# Patient Record
Sex: MALE | Race: WHITE | NOT HISPANIC OR LATINO | Employment: OTHER | URBAN - METROPOLITAN AREA
[De-identification: names, ages, dates, MRNs, and addresses within clinical notes are randomized per-mention and may not be internally consistent; named-entity substitution may affect disease eponyms.]

---

## 2022-08-31 ENCOUNTER — APPOINTMENT (EMERGENCY)
Dept: CT IMAGING | Facility: HOSPITAL | Age: 82
DRG: 871 | End: 2022-08-31
Payer: COMMERCIAL

## 2022-08-31 ENCOUNTER — HOSPITAL ENCOUNTER (INPATIENT)
Facility: HOSPITAL | Age: 82
LOS: 2 days | Discharge: HOME | DRG: 871 | End: 2022-09-02
Attending: EMERGENCY MEDICINE | Admitting: HOSPITALIST
Payer: COMMERCIAL

## 2022-08-31 ENCOUNTER — APPOINTMENT (OUTPATIENT)
Dept: RADIOLOGY | Facility: HOSPITAL | Age: 82
DRG: 871 | End: 2022-08-31
Payer: COMMERCIAL

## 2022-08-31 DIAGNOSIS — N17.9 AKI (ACUTE KIDNEY INJURY) (HCC): ICD-10-CM

## 2022-08-31 DIAGNOSIS — A41.9 SEPSIS WITH ACUTE RENAL FAILURE WITHOUT SEPTIC SHOCK, DUE TO UNSPECIFIED ORGANISM, UNSPECIFIED ACUTE RENAL FAILURE TYPE (HCC): Primary | ICD-10-CM

## 2022-08-31 DIAGNOSIS — R65.20 SEPSIS WITH ACUTE RENAL FAILURE WITHOUT SEPTIC SHOCK, DUE TO UNSPECIFIED ORGANISM, UNSPECIFIED ACUTE RENAL FAILURE TYPE (HCC): Primary | ICD-10-CM

## 2022-08-31 DIAGNOSIS — J18.9 LEFT LOWER LOBE PNEUMONIA: ICD-10-CM

## 2022-08-31 DIAGNOSIS — N17.9 SEPSIS WITH ACUTE RENAL FAILURE WITHOUT SEPTIC SHOCK, DUE TO UNSPECIFIED ORGANISM, UNSPECIFIED ACUTE RENAL FAILURE TYPE (HCC): Primary | ICD-10-CM

## 2022-08-31 PROBLEM — J18.1 LOBAR PNEUMONIA (HCC): Status: ACTIVE | Noted: 2022-08-31

## 2022-08-31 PROBLEM — I10 HTN (HYPERTENSION): Status: ACTIVE | Noted: 2022-08-31

## 2022-08-31 PROBLEM — R79.89 ELEVATED SERUM CREATININE: Status: ACTIVE | Noted: 2022-08-31

## 2022-08-31 PROBLEM — G92.8 TOXIC METABOLIC ENCEPHALOPATHY: Status: ACTIVE | Noted: 2022-08-31

## 2022-08-31 LAB
2HR DELTA HS TROPONIN: 1 NG/L
ALBUMIN SERPL BCP-MCNC: 3.3 G/DL (ref 3.5–5)
ALP SERPL-CCNC: 82 U/L (ref 46–116)
ALT SERPL W P-5'-P-CCNC: 58 U/L (ref 12–78)
ANION GAP SERPL CALCULATED.3IONS-SCNC: 6 MMOL/L (ref 4–13)
APTT PPP: 26 SECONDS (ref 23–37)
AST SERPL W P-5'-P-CCNC: 51 U/L (ref 5–45)
BASE EX.OXY STD BLDV CALC-SCNC: 68.7 % (ref 60–80)
BASE EXCESS BLDA CALC-SCNC: 6 MMOL/L (ref -2–3)
BASE EXCESS BLDV CALC-SCNC: 1.1 MMOL/L
BASOPHILS # BLD AUTO: 0.02 THOUSANDS/ΜL (ref 0–0.1)
BASOPHILS NFR BLD AUTO: 0 % (ref 0–1)
BILIRUB SERPL-MCNC: 1 MG/DL (ref 0.2–1)
BILIRUB UR QL STRIP: NEGATIVE
BUN SERPL-MCNC: 33 MG/DL (ref 5–25)
CA-I BLD-SCNC: 1.14 MMOL/L (ref 1.12–1.32)
CALCIUM ALBUM COR SERPL-MCNC: 9.4 MG/DL (ref 8.3–10.1)
CALCIUM SERPL-MCNC: 8.8 MG/DL (ref 8.3–10.1)
CARDIAC TROPONIN I PNL SERPL HS: 4 NG/L
CARDIAC TROPONIN I PNL SERPL HS: 5 NG/L
CHLORIDE SERPL-SCNC: 98 MMOL/L (ref 96–108)
CLARITY UR: CLEAR
CO2 SERPL-SCNC: 32 MMOL/L (ref 21–32)
COLOR UR: YELLOW
CREAT SERPL-MCNC: 1.85 MG/DL (ref 0.6–1.3)
EOSINOPHIL # BLD AUTO: 0.02 THOUSAND/ΜL (ref 0–0.61)
EOSINOPHIL NFR BLD AUTO: 0 % (ref 0–6)
ERYTHROCYTE [DISTWIDTH] IN BLOOD BY AUTOMATED COUNT: 20.1 % (ref 11.6–15.1)
FLUAV RNA RESP QL NAA+PROBE: NEGATIVE
FLUBV RNA RESP QL NAA+PROBE: NEGATIVE
GFR SERPL CREATININE-BSD FRML MDRD: 33 ML/MIN/1.73SQ M
GLUCOSE SERPL-MCNC: 129 MG/DL (ref 65–140)
GLUCOSE SERPL-MCNC: 157 MG/DL (ref 65–140)
GLUCOSE UR STRIP-MCNC: NEGATIVE MG/DL
HCO3 BLDA-SCNC: 32.2 MMOL/L (ref 24–30)
HCO3 BLDV-SCNC: 26.4 MMOL/L (ref 24–30)
HCT VFR BLD AUTO: 31.5 % (ref 36.5–49.3)
HCT VFR BLD CALC: 31 % (ref 36.5–49.3)
HGB BLD-MCNC: 10.1 G/DL (ref 12–17)
HGB BLDA-MCNC: 10.5 G/DL (ref 12–17)
HGB UR QL STRIP.AUTO: NEGATIVE
IMM GRANULOCYTES # BLD AUTO: 0.19 THOUSAND/UL (ref 0–0.2)
IMM GRANULOCYTES NFR BLD AUTO: 1 % (ref 0–2)
INR PPP: 1.02 (ref 0.84–1.19)
KETONES UR STRIP-MCNC: NEGATIVE MG/DL
LACTATE SERPL-SCNC: 1.3 MMOL/L (ref 0.5–2)
LACTATE SERPL-SCNC: 2.2 MMOL/L (ref 0.5–2)
LEUKOCYTE ESTERASE UR QL STRIP: NEGATIVE
LYMPHOCYTES # BLD AUTO: 0.56 THOUSANDS/ΜL (ref 0.6–4.47)
LYMPHOCYTES NFR BLD AUTO: 3 % (ref 14–44)
MCH RBC QN AUTO: 33.3 PG (ref 26.8–34.3)
MCHC RBC AUTO-ENTMCNC: 32.1 G/DL (ref 31.4–37.4)
MCV RBC AUTO: 104 FL (ref 82–98)
MONOCYTES # BLD AUTO: 1.03 THOUSAND/ΜL (ref 0.17–1.22)
MONOCYTES NFR BLD AUTO: 6 % (ref 4–12)
NEUTROPHILS # BLD AUTO: 14.52 THOUSANDS/ΜL (ref 1.85–7.62)
NEUTS SEG NFR BLD AUTO: 90 % (ref 43–75)
NITRITE UR QL STRIP: NEGATIVE
NRBC BLD AUTO-RTO: 0 /100 WBCS
O2 CT BLDV-SCNC: 10.2 ML/DL
PCO2 BLD: 34 MMOL/L (ref 21–32)
PCO2 BLD: 54.4 MM HG (ref 42–50)
PCO2 BLDV: 45.2 MM HG (ref 42–50)
PH BLD: 7.38 [PH] (ref 7.3–7.4)
PH BLDV: 7.38 [PH] (ref 7.3–7.4)
PH UR STRIP.AUTO: 6 [PH] (ref 4.5–8)
PLATELET # BLD AUTO: 196 THOUSANDS/UL (ref 149–390)
PMV BLD AUTO: 12 FL (ref 8.9–12.7)
PO2 BLD: 22 MM HG (ref 35–45)
PO2 BLDV: 39.8 MM HG (ref 35–45)
POTASSIUM BLD-SCNC: 4.6 MMOL/L (ref 3.5–5.3)
POTASSIUM SERPL-SCNC: 4.5 MMOL/L (ref 3.5–5.3)
PROCALCITONIN SERPL-MCNC: 2.54 NG/ML
PROT SERPL-MCNC: 7.1 G/DL (ref 6.4–8.4)
PROT UR STRIP-MCNC: NEGATIVE MG/DL
PROTHROMBIN TIME: 14.2 SECONDS (ref 11.6–14.5)
RBC # BLD AUTO: 3.03 MILLION/UL (ref 3.88–5.62)
RSV RNA RESP QL NAA+PROBE: NEGATIVE
SAO2 % BLD FROM PO2: 35 % (ref 60–85)
SARS-COV-2 RNA RESP QL NAA+PROBE: NEGATIVE
SODIUM BLD-SCNC: 136 MMOL/L (ref 136–145)
SODIUM SERPL-SCNC: 136 MMOL/L (ref 135–147)
SP GR UR STRIP.AUTO: 1.01 (ref 1–1.03)
SPECIMEN SOURCE: ABNORMAL
UROBILINOGEN UR QL STRIP.AUTO: 0.2 E.U./DL
WBC # BLD AUTO: 16.34 THOUSAND/UL (ref 4.31–10.16)

## 2022-08-31 PROCEDURE — 0241U HB NFCT DS VIR RESP RNA 4 TRGT: CPT | Performed by: PHYSICIAN ASSISTANT

## 2022-08-31 PROCEDURE — 84295 ASSAY OF SERUM SODIUM: CPT

## 2022-08-31 PROCEDURE — 96374 THER/PROPH/DIAG INJ IV PUSH: CPT

## 2022-08-31 PROCEDURE — 71045 X-RAY EXAM CHEST 1 VIEW: CPT

## 2022-08-31 PROCEDURE — 81003 URINALYSIS AUTO W/O SCOPE: CPT

## 2022-08-31 PROCEDURE — 36415 COLL VENOUS BLD VENIPUNCTURE: CPT | Performed by: PHYSICIAN ASSISTANT

## 2022-08-31 PROCEDURE — 93005 ELECTROCARDIOGRAM TRACING: CPT

## 2022-08-31 PROCEDURE — 85014 HEMATOCRIT: CPT

## 2022-08-31 PROCEDURE — 84484 ASSAY OF TROPONIN QUANT: CPT | Performed by: PHYSICIAN ASSISTANT

## 2022-08-31 PROCEDURE — 99285 EMERGENCY DEPT VISIT HI MDM: CPT

## 2022-08-31 PROCEDURE — 85730 THROMBOPLASTIN TIME PARTIAL: CPT | Performed by: PHYSICIAN ASSISTANT

## 2022-08-31 PROCEDURE — 70450 CT HEAD/BRAIN W/O DYE: CPT

## 2022-08-31 PROCEDURE — 84132 ASSAY OF SERUM POTASSIUM: CPT

## 2022-08-31 PROCEDURE — 82330 ASSAY OF CALCIUM: CPT

## 2022-08-31 PROCEDURE — 85610 PROTHROMBIN TIME: CPT | Performed by: PHYSICIAN ASSISTANT

## 2022-08-31 PROCEDURE — 84484 ASSAY OF TROPONIN QUANT: CPT | Performed by: HOSPITALIST

## 2022-08-31 PROCEDURE — 85025 COMPLETE CBC W/AUTO DIFF WBC: CPT | Performed by: PHYSICIAN ASSISTANT

## 2022-08-31 PROCEDURE — 84145 PROCALCITONIN (PCT): CPT | Performed by: PHYSICIAN ASSISTANT

## 2022-08-31 PROCEDURE — 82803 BLOOD GASES ANY COMBINATION: CPT

## 2022-08-31 PROCEDURE — 87040 BLOOD CULTURE FOR BACTERIA: CPT | Performed by: PHYSICIAN ASSISTANT

## 2022-08-31 PROCEDURE — 99223 1ST HOSP IP/OBS HIGH 75: CPT | Performed by: HOSPITALIST

## 2022-08-31 PROCEDURE — 80053 COMPREHEN METABOLIC PANEL: CPT | Performed by: PHYSICIAN ASSISTANT

## 2022-08-31 PROCEDURE — 82805 BLOOD GASES W/O2 SATURATION: CPT | Performed by: PHYSICIAN ASSISTANT

## 2022-08-31 PROCEDURE — 83605 ASSAY OF LACTIC ACID: CPT | Performed by: HOSPITALIST

## 2022-08-31 PROCEDURE — 99285 EMERGENCY DEPT VISIT HI MDM: CPT | Performed by: PHYSICIAN ASSISTANT

## 2022-08-31 PROCEDURE — 82947 ASSAY GLUCOSE BLOOD QUANT: CPT

## 2022-08-31 PROCEDURE — 83605 ASSAY OF LACTIC ACID: CPT | Performed by: PHYSICIAN ASSISTANT

## 2022-08-31 RX ORDER — POLYETHYLENE GLYCOL 3350 17 G/17G
17 POWDER, FOR SOLUTION ORAL DAILY
Status: DISCONTINUED | OUTPATIENT
Start: 2022-08-31 | End: 2022-09-02 | Stop reason: HOSPADM

## 2022-08-31 RX ORDER — BISACODYL 10 MG
10 SUPPOSITORY, RECTAL RECTAL DAILY PRN
Status: DISCONTINUED | OUTPATIENT
Start: 2022-08-31 | End: 2022-09-02 | Stop reason: HOSPADM

## 2022-08-31 RX ORDER — CEFTRIAXONE 1 G/50ML
1000 INJECTION, SOLUTION INTRAVENOUS ONCE
Status: COMPLETED | OUTPATIENT
Start: 2022-08-31 | End: 2022-08-31

## 2022-08-31 RX ORDER — ACETAMINOPHEN 325 MG/1
650 TABLET ORAL EVERY 6 HOURS PRN
Status: DISCONTINUED | OUTPATIENT
Start: 2022-08-31 | End: 2022-09-02 | Stop reason: HOSPADM

## 2022-08-31 RX ORDER — HEPARIN SODIUM 5000 [USP'U]/ML
5000 INJECTION, SOLUTION INTRAVENOUS; SUBCUTANEOUS EVERY 8 HOURS SCHEDULED
Status: DISCONTINUED | OUTPATIENT
Start: 2022-08-31 | End: 2022-09-02 | Stop reason: HOSPADM

## 2022-08-31 RX ORDER — ONDANSETRON 2 MG/ML
4 INJECTION INTRAMUSCULAR; INTRAVENOUS EVERY 6 HOURS PRN
Status: DISCONTINUED | OUTPATIENT
Start: 2022-08-31 | End: 2022-09-02 | Stop reason: HOSPADM

## 2022-08-31 RX ORDER — DOCUSATE SODIUM 100 MG/1
100 CAPSULE, LIQUID FILLED ORAL 2 TIMES DAILY
Status: DISCONTINUED | OUTPATIENT
Start: 2022-08-31 | End: 2022-09-02 | Stop reason: HOSPADM

## 2022-08-31 RX ORDER — SENNOSIDES 8.6 MG
1 TABLET ORAL DAILY
Status: DISCONTINUED | OUTPATIENT
Start: 2022-08-31 | End: 2022-09-02 | Stop reason: HOSPADM

## 2022-08-31 RX ORDER — SODIUM CHLORIDE 9 MG/ML
125 INJECTION, SOLUTION INTRAVENOUS CONTINUOUS
Status: DISPENSED | OUTPATIENT
Start: 2022-08-31 | End: 2022-09-01

## 2022-08-31 RX ADMIN — SENNOSIDES 8.6 MG: 8.6 TABLET, FILM COATED ORAL at 14:06

## 2022-08-31 RX ADMIN — Medication 3.38 G: at 19:46

## 2022-08-31 RX ADMIN — Medication 3.38 G: at 14:05

## 2022-08-31 RX ADMIN — SODIUM CHLORIDE 125 ML/HR: 0.9 INJECTION, SOLUTION INTRAVENOUS at 14:11

## 2022-08-31 RX ADMIN — HEPARIN SODIUM 5000 UNITS: 5000 INJECTION INTRAVENOUS; SUBCUTANEOUS at 14:06

## 2022-08-31 RX ADMIN — SODIUM CHLORIDE 500 ML: 0.9 INJECTION, SOLUTION INTRAVENOUS at 11:24

## 2022-08-31 RX ADMIN — AZITHROMYCIN MONOHYDRATE 500 MG: 500 INJECTION, POWDER, LYOPHILIZED, FOR SOLUTION INTRAVENOUS at 11:54

## 2022-08-31 RX ADMIN — CEFTRIAXONE 1000 MG: 1 INJECTION, SOLUTION INTRAVENOUS at 11:24

## 2022-08-31 RX ADMIN — HEPARIN SODIUM 5000 UNITS: 5000 INJECTION INTRAVENOUS; SUBCUTANEOUS at 22:21

## 2022-08-31 RX ADMIN — DOCUSATE SODIUM 100 MG: 100 CAPSULE, LIQUID FILLED ORAL at 17:44

## 2022-08-31 NOTE — SEPSIS NOTE
Sepsis Note   Amanda Carreno 81 y o  male MRN: [id number]  Unit/Bed#: ED 03 Encounter: [id number]       qSOFA     Row Name 08/31/22 1103 08/31/22 1026             Altered mental status GCS < 15 1 --       Respiratory Rate > / =22 -- 1       Systolic BP < / =353 -- 0       Q Sofa Score 2 1                  Initial Sepsis Screening     Row Name 08/31/22 1112                Is the patient's history suggestive of a new or worsening infection? Yes (Proceed)  -RR        Suspected source of infection pneumonia  -RR        Are two or more of the following signs & symptoms of infection both present and new to the patient? Yes (Proceed)  -RR        Indicate SIRS criteria Tachypnea > 20 resp per min;Leukocytosis (WBC > 01872 IJL)  -RR        If the answer is yes to both questions, suspicion of sepsis is present --        If severe sepsis is present AND tissue hypoperfusion perists in the hour after fluid resuscitation or lactate > 4, the patient meets criteria for SEPTIC SHOCK --        Are any of the following organ dysfunction criteria present within 6 hours of suspected infection and SIRS criteria that are NOT considered to be chronic conditions? Yes  -RR        Organ dysfunction Lactate > 2 0 mmol/L  -RR        Date of presentation of severe sepsis --        Time of presentation of severe sepsis 1114  -RR        Tissue hypoperfusion persists in the hour after crystalloid fluid administration, evidenced, by either: --        Was hypotension present within one hour of the conclusion of crystalloid fluid administration?  No  -RR        Date of presentation of septic shock --        Time of presentation of septic shock --              User Key  (r) = Recorded By, (t) = Taken By, (c) = Cosigned By    [facility] [address]  Name Provider Type    [address] [place] [address], PA-C Physician Assistant

## 2022-08-31 NOTE — ASSESSMENT & PLAN NOTE
IVF  Zosyn  Follow blood cultures  Follow repeat lactate  Check PCT in the AM  Monitor supplemental o2 as able to

## 2022-08-31 NOTE — ED NOTES
Patient transported to [address] [place] [address],[address], RN  08/31/22 1100

## 2022-08-31 NOTE — PLAN OF CARE
Problem: MOBILITY - ADULT  Goal: Maintain or return to baseline ADL function  Description: INTERVENTIONS:  -  Assess patient's ability to carry out ADLs; assess patient's baseline for ADL function and identify physical deficits which impact ability to perform ADLs (bathing, care of mouth/teeth, toileting, grooming, dressing, etc )  - Assess/evaluate cause of self-care deficits   - Assess range of motion  - Assess patient's mobility; develop plan if impaired  - Assess patient's need for assistive devices and provide as appropriate  - Encourage maximum independence but intervene and supervise when necessary  - Involve family in performance of ADLs  - Assess for home care needs following discharge   - Consider OT consult to assist with ADL evaluation and planning for discharge  - Provide patient education as appropriate  Outcome: Progressing  Goal: Maintains/Returns to pre admission functional level  Description: INTERVENTIONS:  - Perform BMAT or MOVE assessment daily    - Set and communicate daily mobility goal to care team and patient/family/caregiver  - Collaborate with rehabilitation services on mobility goals if consulted  - Perform Range of Motion  times a day  - Reposition patient every  hours    - Dangle patient  times a day  - Stand patient  times a day  - Ambulate patient  times a day  - Out of bed to chair  times a day   - Out of bed for meals  times a day  - Out of bed for toileting  - Record patient progress and toleration of activity level   Outcome: Progressing     Problem: Potential for Falls  Goal: Patient will remain free of falls  Description: INTERVENTIONS:  - Educate patient/family on patient safety including physical limitations  - Instruct patient to call for assistance with activity   - Consult OT/PT to assist with strengthening/mobility   - Keep Call bell within reach  - Keep bed low and locked with side rails adjusted as appropriate  - Keep care items and personal belongings within reach  - Initiate and maintain comfort rounds  - Make Fall Risk Sign visible to staff  - Offer Toileting every Hours, in advance of need  - Initiate/Maintain alarm  - Obtain necessary fall risk management equipment  - Apply yellow socks and bracelet for high fall risk patients  - Consider moving patient to room near nurses station  Outcome: Progressing

## 2022-08-31 NOTE — QUICK NOTE
QUICK NOTE - Deterioration Index  Delia Acuna 81 y o  male MRN: [id number]  Unit/Bed#: -01 Encounter: [id number]      Time Paged: [address] #: [id number]  DUSTYD RN: Dustin Raphael Time: 1850  Deterioration index score at time of page: 16 17    PROBLEMS:    Hypoxia    PLAN:     No interventions needed  Pulse ox was documented at 60 %, however - patient was not hypoxic  He is on 0 5L not in any distress       Code Status: Level 1 - Full Code

## 2022-08-31 NOTE — H&P
Bob Shayne  H&PSharlon Pair 1940, 81 y o  male MRN: [id number]  Unit/Bed#: -[facility] Encounter: [id number]  Primary Care Provider: No primary care provider on file  Date and time admitted to hospital: 8/31/2022 10:18 AM    Elevated serum creatinine  Assessment & Plan  Cr 1 85 unclear if ALANA or CKD  Monitor on ivf, avoid nephrotoxins    HTN (hypertension)  Assessment & Plan  Patient unsure of home regimen  Hold     Lobar pneumonia ([facility] [address] )  Assessment & Plan  IVF  Zosyn  Follow blood cultures  Follow repeat lactate  Check PCT in the AM  Monitor supplemental o2 as able to    Toxic metabolic encephalopathy  Assessment & Plan  2/2 Sepsis  CT head negative      Sepsis ([facility] [address] )  Assessment & Plan  2/2 LLL pneumonia, see Lobar pneumonia        Chief Complaint   Patient presents with    Altered Mental Status     Patient arrives via EMS, he was at breakfast with his friends and they noted increased confusion  EMS recorded a temp of 103 oral, he was satting at 80% on room air and was incontinent of urine  Patient disoriented to time and situation upon ED arrival         HPI:  Mukesh Avery is a 81 y o  male who presents with  concerns for confusion by his friends  Patient was out to lunch with his friends today  He was noted to be not himself, slower to speak and EMS was called  Patient was noted then to be febrile  Patient has limited recollection of the situation but his mentation has improved following oxygen, fluids, antibiotics  Patient states he was fine prior to the event  He denies chest pain he denies weakness  He denies dyspnea prior to today  He denies mucopurulent cough  Historical Information   Past Medical History:   Diagnosis Date    Hypertension      History reviewed  No pertinent surgical history    Social History   Social History     Substance and Sexual Activity   Alcohol Use Never     Social History     Substance and Sexual Activity   Drug Use Never Social History     Tobacco Use   Smoking Status Never Smoker   Smokeless Tobacco Never Used     History reviewed  No pertinent family history  Meds/Allergies   No Known Allergies    Meds:    Current Facility-Administered Medications:     acetaminophen (TYLENOL) tablet 650 mg, 650 mg, Oral, Q6H PRN, Wiley Gonzales MD    bisacodyl (DULCOLAX) rectal suppository 10 mg, 10 mg, Rectal, Daily PRN, Wiley Gonzales MD    docusate sodium (COLACE) capsule 100 mg, 100 mg, Oral, BID, Tonya Nava MD    heparin (porcine) subcutaneous injection 5,000 Units, 5,000 Units, Subcutaneous, Q8H [address] **AND** Platelet count, , , Once, Wiley Gonzales MD    ondansetron [facility]) injection 4 mg, 4 mg, Intravenous, Q6H PRN, Wiley Gonzales MD    piperacillin-tazobactam (ZOSYN) IVPB 3 375 g, 3 375 g, Intravenous, Q6H, Tonya aNva MD    polyethylene glycol (MIRALAX) packet 17 g, 17 g, Oral, Daily, Wiley Gonzales MD    [facility]) tablet 8 6 mg, 1 tablet, Oral, Daily, Wiley Gonzales MD    sodium chloride 0 9 % infusion, 125 mL/hr, Intravenous, Continuous, Tonya Nava MD    No medications prior to admission           Review of Systems:    A complete and comprehensive 14 point organ system review was performed and all other systems are negative other than stated above in the HPI    Current Vitals:   Blood Pressure: 104/51 (08/31/22 1252)  Pulse: 91 (08/31/22 1252)  Temperature: 98 7 °F (37 1 °C) (08/31/22 1252)  Temp Source: Oral (08/31/22 1026)  Respirations: 18 (08/31/22 1252)  Height: 5' 6" (167 6 cm) (08/31/22 1026)  Weight - Scale: 90 7 kg (200 lb) (08/31/22 1026)  SpO2: 96 % (08/31/22 1252)  SPO2 RA Rest    Flowsheet Row ED to Hosp-Admission (Current) from 8/31/2022 in [address] Med Surg Unit   SpO2 96 %   SpO2 Activity --   O2 Device Nasal cannula   O2 Flow Rate --          Intake/Output Summary (Last 24 hours) at 8/31/2022 6764  Last data filed at 8/31/2022 1202  Gross per 24 hour   Intake 50 ml   Output --   Net 50 ml     Body mass index is 32 28 kg/m²       Physical Exam:     General: well appearing, no acute distress, sweats , on o2  HEENT: atraumatic, PERRLA, moist mucosa, normal pharynx, normal tonsils and adenoids, normal tongue, no fluid in sinuses  Neck: Trachea midline, no carotid bruit, no masses  Respiratory: normal chest wall expansion, CTA B, no r/r/w, no rubs  Cardiovascular: RRR, no m/r/g, Normal S1 and S2  Abdomen: Soft, non-tender, non-distended, normal bowel sounds in all quadrants, no hepatosplenomegaly, no tympany  Rectal: deferred  Musculoskeletal: normal ROM in upper and lower extremities  Integumentary: warm, dry, and pink, with no rash, purpura, or petechia  Heme/Lymph: no lymphadenopathy, no bruises  Neurological: Cranial Nerves II-XII grossly intact; no focal deficits in sensation or strength, A  x O x 3  Psychiatric: cooperative with normal mood, affect, and cognition    Lab Results:   CBC:   Lab Results   Component Value Date    WBC 16 34 (H) 08/31/2022    HGB 10 5 (L) 08/31/2022    HCT 31 (L) 08/31/2022     (H) 08/31/2022     08/31/2022    MCH 33 3 08/31/2022    MCHC 32 1 08/31/2022    RDW 20 1 (H) 08/31/2022    MPV 12 0 08/31/2022    NRBC 0 08/31/2022     CMP:  Lab Results   Component Value Date    CL 98 08/31/2022    CO2 34 (H) 08/31/2022    CO2 32 08/31/2022    BUN 33 (H) 08/31/2022    CREATININE 1 85 (H) 08/31/2022    GLUCOSE 157 (H) 08/31/2022    CALCIUM 8 8 08/31/2022    AST 51 (H) 08/31/2022    ALT 58 08/31/2022    ALKPHOS 82 08/31/2022    EGFR 33 08/31/2022     No results found for: TROPONINI, CKMB, CKTOTAL  Coagulation:   Lab Results   Component Value Date    INR 1 02 08/31/2022    Urinalysis:  Lab Results   Component Value Date    COLORU Yellow 08/31/2022    CLARITYU Clear 08/31/2022    SPECGRAV 1 010 08/31/2022    PHUR 6 0 08/31/2022    LEUKOCYTESUR Negative 08/31/2022    NITRITE Negative 08/31/2022 GLUCOSEU Negative 08/31/2022    KETONESU Negative 08/31/2022    BILIRUBINUR Negative 08/31/2022    BLOODU Negative 08/31/2022      Amylase: No results found for: AMYLASE  Lipase: No results found for: LIPASE     Imaging: XR chest 1 view portable    Result Date: 8/31/2022  Narrative: CHEST INDICATION:   r/o PNA  COMPARISON:  None EXAM PERFORMED/VIEWS:  XR CHEST PORTABLE FINDINGS: Cardiomediastinal silhouette is prominent in size  Low inspiratory effort  Left basilar atelectasis  No pneumothorax or pleural effusion  Osseous structures appear within normal limits for patient age  Impression: Limited by low lung volumes  Left basilar atelectasis  Workstation performed: [id number]     CT head without contrast    Result Date: 8/31/2022  Narrative: CT BRAIN - WITHOUT CONTRAST INDICATION:  Altered mental status  COMPARISON:  None TECHNIQUE:  CT examination of the brain was performed  In addition to axial images, sagittal and coronal 2D reformatted images were created and submitted for interpretation  Radiation dose length product (DLP) for this visit:  884 mGy-cm  This examination, like all CT scans performed in the [facility], was performed utilizing techniques to minimize radiation dose exposure, including the use of iterative reconstruction and automated exposure control  IMAGE QUALITY:  Diagnostic  FINDINGS: PARENCHYMA:  No intracranial mass, mass effect or midline shift  No CT signs of acute infarction  No acute parenchymal hemorrhage  Age-related cortical atrophy  Periventricular white matter hypodensity which is nonspecific although most compatible with chronic small vessel ischemic disease  Vascular calcifications  VENTRICLES AND EXTRA-AXIAL SPACES:  Normal for the patient's age  VISUALIZED ORBITS AND PARANASAL SINUSES:  Unremarkable  CALVARIUM AND EXTRACRANIAL SOFT TISSUES:  Normal  Lucency surrounding incompletely visualized right anterolateral maxillary tooth root on image 1  Impression: No acute intracranial abnormality  Chronic microangiopathic changes  Lucency surrounding incompletely visualized right anterolateral maxillary tooth root on image 1  Correlate clinically for infection and/or loosening  Workstation performed: [id number]     EKG, Pathology, and Other Studies: I have personally reviewed the results  VTE   Prophylaxis: In place    Code Status: Level 1 - Full Code    Anticipated Length of Stay:  Patient will be admitted on an Inpatient basis with an anticipated length of stay of   Greater  2 midnights  Counseling / Coordination of Care  Total floor / unit time spent today 48 minutes  Greater than 50% of total time was spent with the patient and / or family counseling and / or coordination of care       "This note has been constructed using a voice recognition system"      Juan Romo MD  8/31/2022, 1:34 PM

## 2022-08-31 NOTE — ED PROVIDER NOTES
History  Chief Complaint   Patient presents with    Altered Mental Status     Patient arrives via EMS, he was at breakfast with his friends and they noted increased confusion  EMS recorded a temp of 103 oral, he was satting at 80% on room air and was incontinent of urine  Patient disoriented to time and situation upon ED arrival      81-year-old male sent to hospital for alteration in mental status  Patient was going to breakfast with some of his friends after [place] and was reported to be confused  Patient attempted to leave and got his car and ultimately EMS did arrive and was able to convince patient come the emergency department  Was febrile and with a temperature 103° and route GCS of 14 for verbal responses  Not hypotensive  None       Past Medical History:   Diagnosis Date    Hypertension        History reviewed  No pertinent surgical history  History reviewed  No pertinent family history  I have reviewed and agree with the history as documented  E-Cigarette/Vaping    E-Cigarette Use Never User      E-Cigarette/Vaping Substances     Social History     Tobacco Use    Smoking status: Never Smoker    Smokeless tobacco: Never Used   Vaping Use    Vaping Use: Never used   Substance Use Topics    Alcohol use: Never    Drug use: Never       Review of Systems   Constitutional: Negative for chills and fever  HENT: Negative for ear pain and sore throat  Eyes: Negative for pain and visual disturbance  Respiratory: Negative for cough and shortness of breath  Cardiovascular: Negative for chest pain and palpitations  Gastrointestinal: Negative for abdominal pain and vomiting  Genitourinary: Negative for dysuria and hematuria  Musculoskeletal: Negative for arthralgias and back pain  Skin: Negative for color change and rash  Neurological: Negative for seizures and syncope  Psychiatric/Behavioral: Positive for confusion     All other systems reviewed and are negative  Physical Exam  Physical Exam  Vitals and nursing note reviewed  Constitutional:       Appearance: He is well-developed  HENT:      Head: Normocephalic and atraumatic  Eyes:      Conjunctiva/sclera: Conjunctivae normal    Cardiovascular:      Rate and Rhythm: Normal rate and regular rhythm  Heart sounds: No murmur heard  Pulmonary:      Effort: Pulmonary effort is normal  No respiratory distress  Breath sounds: Normal breath sounds  Abdominal:      Palpations: Abdomen is soft  Tenderness: There is no abdominal tenderness  Musculoskeletal:      Cervical back: Neck supple  Skin:     General: Skin is warm and dry  Neurological:      Mental Status: He is alert  GCS: GCS eye subscore is 4  GCS verbal subscore is 4  GCS motor subscore is 6           Vital Signs  ED Triage Vitals [08/31/22 1026]   Temperature Pulse Respirations Blood Pressure SpO2   100 2 °F (37 9 °C) (!) 108 22 127/60 94 %      Temp Source Heart Rate Source Patient Position - Orthostatic VS BP Location FiO2 (%)   Oral Monitor Lying Right arm --      Pain Score       No Pain           Vitals:    08/31/22 1026 08/31/22 1042 08/31/22 1130   BP: 127/60  104/53   Pulse: (!) 108 (!) 108 96   Patient Position - Orthostatic VS: Lying           Visual Acuity      ED Medications  Medications   cefTRIAXone (ROCEPHIN) IVPB (premix in dextrose) 1,000 mg 50 mL (1,000 mg Intravenous New Bag 8/31/22 1124)   azithromycin (ZITHROMAX) 500 mg in sodium chloride 0 9% 250mL IVPB 500 mg (has no administration in time range)   sodium chloride 0 9 % bolus 500 mL (500 mL Intravenous New Bag 8/31/22 1124)       Diagnostic Studies  Results Reviewed     Procedure Component Value Units Date/Time    HS Troponin 0hr (reflex protocol) [[id number]]  (Normal) Collected: 08/31/22 1059    Lab Status: Final result Specimen: Blood from Arm, Left Updated: 08/31/22 1129     hs TnI 0hr 4 ng/L     HS Troponin I 2hr [[id number]]     Lab Status: No result Specimen: Blood     CBC and differential [[id number]]  (Abnormal) Collected: 08/31/22 1033    Lab Status: Final result Specimen: Blood from Arm, Left Updated: 08/31/22 1129     WBC 16 34 Thousand/uL      RBC 3 03 Million/uL      Hemoglobin 10 1 g/dL      Hematocrit 31 5 %       fL      MCH 33 3 pg      MCHC 32 1 g/dL      RDW 20 1 %      MPV 12 0 fL      Platelets 555 Thousands/uL      nRBC 0 /100 WBCs      Neutrophils Relative 90 %      Immat GRANS % 1 %      Lymphocytes Relative 3 %      Monocytes Relative 6 %      Eosinophils Relative 0 %      Basophils Relative 0 %      Neutrophils Absolute 14 52 Thousands/µL      Immature Grans Absolute 0 19 Thousand/uL      Lymphocytes Absolute 0 56 Thousands/µL      Monocytes Absolute 1 03 Thousand/µL      Eosinophils Absolute 0 02 Thousand/µL      Basophils Absolute 0 02 Thousands/µL     Narrative: This is an appended report  These results have been appended to a previously verified report  FLU/RSV/COVID - if FLU/RSV clinically relevant [[id number]]  (Normal) Collected: 08/31/22 1033    Lab Status: Final result Specimen: Nares from Nose Updated: 08/31/22 1118     SARS-CoV-2 Negative     INFLUENZA A PCR Negative     INFLUENZA B PCR Negative     RSV PCR Negative    Narrative:      FOR PEDIATRIC PATIENTS - copy/paste COVID Guidelines URL to browser: [email or web address]    SARS-CoV-2 assay is a Nucleic Acid Amplification assay intended for the  qualitative detection of nucleic acid from SARS-CoV-2 in nasopharyngeal  swabs  Results are for the presumptive identification of SARS-CoV-2 RNA  Positive results are indicative of infection with SARS-CoV-2, the virus  causing COVID-19, but do not rule out bacterial infection or co-infection  with other viruses  Laboratories within the [place] and its  territories are required to report all positive results to the appropriate  public health authorities  Negative results do not preclude SARS-CoV-2  infection and should not be used as the sole basis for treatment or other  patient management decisions  Negative results must be combined with  clinical observations, patient history, and epidemiological information  This test has not been FDA cleared or approved  This test has been authorized by FDA under an Emergency Use Authorization  (EUA)  This test is only authorized for the duration of time the  declaration that circumstances exist justifying the authorization of the  emergency use of an in vitro diagnostic tests for detection of SARS-CoV-2  virus and/or diagnosis of COVID-19 infection under section 564(b)(1) of  the Act, 21 U  S C  446QGB-7(L)(1), unless the authorization is terminated  or revoked sooner  The test has been validated but independent review by FDA  and CLIA is pending  Test performed using [employer] Gene[employer]pert: This RT-PCR assay targets N2,  a region unique to SARS-CoV-2  A conserved region in the E-gene was chosen  for pan-Sarbecovirus detection which includes SARS-CoV-2  Lactic acid [[id number]]  (Abnormal) Collected: 08/31/22 1036    Lab Status: Final result Specimen: Blood from Arm, Left Updated: 08/31/22 1115     LACTIC ACID 2 2 mmol/L     Narrative:      Result may be elevated if tourniquet was used during collection      Lactic acid 2 Hours [[id number]]     Lab Status: No result Specimen: Blood     Procalcitonin [[id number]]  (Abnormal) Collected: 08/31/22 1036    Lab Status: Final result Specimen: Blood from Arm, Left Updated: 08/31/22 1113     Procalcitonin 2 54 ng/ml     Comprehensive metabolic panel [[id number]]  (Abnormal) Collected: 08/31/22 1033    Lab Status: Final result Specimen: Blood from Arm, Left Updated: 08/31/22 1058     Sodium 136 mmol/L      Potassium 4 5 mmol/L      Chloride 98 mmol/L      CO2 32 mmol/L      ANION GAP 6 mmol/L      BUN 33 mg/dL      Creatinine 1 85 mg/dL      Glucose 129 mg/dL      Calcium 8 8 mg/dL Corrected Calcium 9 4 mg/dL      AST 51 U/L      ALT 58 U/L      Alkaline Phosphatase 82 U/L      Total Protein 7 1 g/dL      Albumin 3 3 g/dL      Total Bilirubin 1 00 mg/dL      eGFR 33 ml/min/1 73sq m     Narrative:      [place] guidelines for Chronic Kidney Disease (CKD):     Stage 1 with normal or high GFR (GFR > 90 mL/min/1 73 square meters)    Stage 2 Mild CKD (GFR = 60-89 mL/min/1 73 square meters)    Stage 3A Moderate CKD (GFR = 45-59 mL/min/1 73 square meters)    Stage 3B Moderate CKD (GFR = 30-44 mL/min/1 73 square meters)    Stage 4 Severe CKD (GFR = 15-29 mL/min/1 73 square meters)    Stage 5 End Stage CKD (GFR <15 mL/min/1 73 square meters)  Note: GFR calculation is accurate only with a steady state creatinine    Protime-INR [[id number]]  (Normal) Collected: 08/31/22 1033    Lab Status: Final result Specimen: Blood from Arm, Left Updated: 08/31/22 1053     Protime 14 2 seconds      INR 1 02    APTT [[id number]]  (Normal) Collected: 08/31/22 1033    Lab Status: Final result Specimen: Blood from Arm, Left Updated: 08/31/22 1053     PTT 26 seconds     Urine Macroscopic, POC [[id number]] Collected: 08/31/22 1050    Lab Status: Final result Specimen: Urine Updated: 08/31/22 1052     Color, UA Yellow     Clarity, UA Clear     pH, UA 6 0     Leukocytes, UA Negative     Nitrite, UA Negative     Protein, UA Negative mg/dl      Glucose, UA Negative mg/dl      Ketones, UA Negative mg/dl      Urobilinogen, UA 0 2 E U /dl      Bilirubin, UA Negative     Occult Blood, UA Negative     Specific [place], UA 1 010    POCT Blood Gas (CG8+) [[id number]]  (Abnormal) Collected: 08/31/22 1042    Lab Status: Final result Specimen: Venous Updated: 08/31/22 1051     ph, Vivek ISTAT 7 381     pCO2, Vivek i-STAT 54 4 mm HG      pO2, Vivek i-STAT 22 0 mm HG      BE, i-STAT 6 mmol/L      HCO3, Vivek i-STAT 32 2 mmol/L      CO2, i-STAT 34 mmol/L      O2 Sat, i-STAT 35 %      SODIUM, I-STAT 136 mmol/l Potassium, i-STAT 4 6 mmol/L      Calcium, Ionized i-STAT 1 14 mmol/L      Hct, i-STAT 31 %      Hgb, i-STAT 10 5 g/dl      Glucose, i-STAT 157 mg/dl      Specimen Type VENOUS    Blood culture #2 [[id number]] Collected: 08/31/22 1048    Lab Status: In process Specimen: Blood from Arm, Left Updated: 08/31/22 1051    Blood culture #1 [[id number]] Collected: 08/31/22 1036    Lab Status: In process Specimen: Blood from Hand, Left Updated: 08/31/22 1043    Blood gas, Venous [[id number]] Collected: 08/31/22 1036    Lab Status: No result Specimen: Blood from Arm, Right     Sputum culture and Gram stain [[id number]]     Lab Status: No result Specimen: Sputum                  CT head without contrast   Final Result by Neela Baca DO (08/31 1127)      No acute intracranial abnormality  Chronic microangiopathic changes  Lucency surrounding incompletely visualized right anterolateral maxillary tooth root on image 1  Correlate clinically for infection and/or loosening              Workstation performed: [id number]         XR chest 1 view portable    (Results Pending)              Procedures  ECG 12 Lead Documentation Only    Date/Time: 8/31/2022 10:31 AM  Performed by: Kate Stevens PA-C  Authorized by: Kate Stevens PA-C     Indications / Diagnosis:  Altered mental status  ECG reviewed by me, the ED Provider: yes (and by Dr Tressa Siddiqui)    Patient location:  ED  Previous ECG:     Previous ECG:  Unavailable    Comparison to cardiac monitor: Yes    Interpretation:     Interpretation: abnormal    Rate:     ECG rate:  109    ECG rate assessment: normal    Rhythm:     Rhythm: sinus rhythm    Ectopy:     Ectopy: none    QRS:     QRS axis:  Normal  Conduction:     Conduction: abnormal      Abnormal conduction: bifascicular block    ST segments:     ST segments:  Normal  T waves:     T waves: normal               ED Course  ED Course as of 08/31/22 1131   Wed Aug 31, 2022   1117 Severe sepsis by criteria with end-organ injury  Ordered Rocephin and Zithromax antibiotics  1 L saline ordered  X-ray with left lower lobe infiltrate likely consistent with pneumonia  1130 Case discussed with Dr Baltazar Pollard of [facility] who agrees for inpatient admission for sepsis  Antibiotics in progress  Initial troponin negative  Initial Sepsis Screening     Row Name 08/31/22 1112                Is the patient's history suggestive of a new or worsening infection? Yes (Proceed)  -RR        Suspected source of infection pneumonia  -RR        Are two or more of the following signs & symptoms of infection both present and new to the patient? Yes (Proceed)  -RR        Indicate SIRS criteria Tachypnea > 20 resp per min;Leukocytosis (WBC > 73479 IJL)  -RR        If the answer is yes to both questions, suspicion of sepsis is present --        If severe sepsis is present AND tissue hypoperfusion perists in the hour after fluid resuscitation or lactate > 4, the patient meets criteria for SEPTIC SHOCK --        Are any of the following organ dysfunction criteria present within 6 hours of suspected infection and SIRS criteria that are NOT considered to be chronic conditions? Yes  -RR        Organ dysfunction Lactate > 2 0 mmol/L  -RR        Date of presentation of severe sepsis --        Time of presentation of severe sepsis 1114  -RR        Tissue hypoperfusion persists in the hour after crystalloid fluid administration, evidenced, by either: --        Was hypotension present within one hour of the conclusion of crystalloid fluid administration?  No  -RR        Date of presentation of septic shock --        Time of presentation of septic shock --              User Key  (r) = Recorded By, (t) = Taken By, (c) = Cosigned By    [address] Name Provider Type    [facility], PA-C Physician Assistant                SBIRT 22yo+    Flowsheet Row Most Recent Value   SBIRT (25 yo +)    In order to provide better care to our patients, we are screening all of our patients for alcohol and drug use  Would it be okay to ask you these screening questions? Yes Filed at: 08/31/2022 1032   Initial Alcohol Screen: US AUDIT-C     1  How often do you have a drink containing alcohol? 0 Filed at: 08/31/2022 1032   2  How many drinks containing alcohol do you have on a typical day you are drinking? 0 Filed at: 08/31/2022 1032   3b  FEMALE Any Age, or MALE 65+: How often do you have 4 or more drinks on one occassion? 0 Filed at: 08/31/2022 1032   Audit-C Score 0 Filed at: 08/31/2022 1032   RADHA: How many times in the past year have you    Used an illegal drug or used a prescription medication for non-medical reasons?  Never Filed at: 08/31/2022 1032                    MDM  Number of Diagnoses or Management Options  ALANA (acute kidney injury) [facility]): new and requires workup  Left lower lobe pneumonia: new and requires workup     Amount and/or Complexity of Data Reviewed  Clinical lab tests: ordered and reviewed  Tests in the radiology section of CPT®: ordered and reviewed        Disposition  Final diagnoses:   Sepsis with acute renal failure without septic shock, due to unspecified organism, unspecified acute renal failure type ([facility] [address] )   ALANA (acute kidney injury) ([facility] [address] )   Left lower lobe pneumonia     Time reflects when diagnosis was documented in both MDM as applicable and the Disposition within this note     Time User Action Codes Description Comment    8/31/2022 11:29 AM Dollene Mustard Add [A41 9,  R65 20,  N17 9] Sepsis with acute renal failure without septic shock, due to unspecified organism, unspecified acute renal failure type ([facility] [address] )     8/31/2022 11:30 AM Dollene Mustard Add [N17 9] ALANA (acute kidney injury) ([facility] [address] )     8/31/2022 11:30 AM Dollene Mustard Add [J18 9] Left lower lobe pneumonia       ED Disposition     ED Disposition   Admit    Condition   Stable    Date/Time   Wed Aug 31, 2022 11:30 AM    Comment   Case was discussed with   Oscar Beth and the patient's admission status was agreed to be Admission Status: inpatient status to the service of Dr Dr Oscar Beth of [facility]   Follow-up Information    None         Patient's Medications    No medications on file       No discharge procedures on file      PDMP Review     None          ED Provider  Electronically Signed by           Dorothae Burns PA-C  09/04/22 2760

## 2022-09-01 PROBLEM — D64.9 ANEMIA: Status: ACTIVE | Noted: 2022-09-01

## 2022-09-01 LAB
ALBUMIN SERPL BCP-MCNC: 2.5 G/DL (ref 3.5–5)
ALP SERPL-CCNC: 68 U/L (ref 46–116)
ALT SERPL W P-5'-P-CCNC: 70 U/L (ref 12–78)
ANION GAP SERPL CALCULATED.3IONS-SCNC: 6 MMOL/L (ref 4–13)
AST SERPL W P-5'-P-CCNC: 58 U/L (ref 5–45)
BASOPHILS # BLD AUTO: 0.01 THOUSANDS/ΜL (ref 0–0.1)
BASOPHILS NFR BLD AUTO: 0 % (ref 0–1)
BILIRUB SERPL-MCNC: 1.5 MG/DL (ref 0.2–1)
BUN SERPL-MCNC: 33 MG/DL (ref 5–25)
CALCIUM ALBUM COR SERPL-MCNC: 9.3 MG/DL (ref 8.3–10.1)
CALCIUM SERPL-MCNC: 8.1 MG/DL (ref 8.3–10.1)
CHLORIDE SERPL-SCNC: 102 MMOL/L (ref 96–108)
CO2 SERPL-SCNC: 27 MMOL/L (ref 21–32)
CREAT SERPL-MCNC: 1.81 MG/DL (ref 0.6–1.3)
EOSINOPHIL # BLD AUTO: 0.01 THOUSAND/ΜL (ref 0–0.61)
EOSINOPHIL NFR BLD AUTO: 0 % (ref 0–6)
ERYTHROCYTE [DISTWIDTH] IN BLOOD BY AUTOMATED COUNT: 20.3 % (ref 11.6–15.1)
FERRITIN SERPL-MCNC: 510 NG/ML (ref 8–388)
GFR SERPL CREATININE-BSD FRML MDRD: 34 ML/MIN/1.73SQ M
GLUCOSE SERPL-MCNC: 120 MG/DL (ref 65–140)
HCT VFR BLD AUTO: 24.4 % (ref 36.5–49.3)
HGB BLD-MCNC: 7.9 G/DL (ref 12–17)
IMM GRANULOCYTES # BLD AUTO: 0.08 THOUSAND/UL (ref 0–0.2)
IMM GRANULOCYTES NFR BLD AUTO: 1 % (ref 0–2)
IRON SATN MFR SERPL: 20 % (ref 20–50)
IRON SERPL-MCNC: 62 UG/DL (ref 65–175)
LYMPHOCYTES # BLD AUTO: 0.84 THOUSANDS/ΜL (ref 0.6–4.47)
LYMPHOCYTES NFR BLD AUTO: 8 % (ref 14–44)
MCH RBC QN AUTO: 33.8 PG (ref 26.8–34.3)
MCHC RBC AUTO-ENTMCNC: 32.4 G/DL (ref 31.4–37.4)
MCV RBC AUTO: 104 FL (ref 82–98)
MONOCYTES # BLD AUTO: 1.2 THOUSAND/ΜL (ref 0.17–1.22)
MONOCYTES NFR BLD AUTO: 12 % (ref 4–12)
NEUTROPHILS # BLD AUTO: 8.18 THOUSANDS/ΜL (ref 1.85–7.62)
NEUTS SEG NFR BLD AUTO: 79 % (ref 43–75)
NRBC BLD AUTO-RTO: 0 /100 WBCS
PLATELET # BLD AUTO: 124 THOUSANDS/UL (ref 149–390)
PMV BLD AUTO: 10.7 FL (ref 8.9–12.7)
POTASSIUM SERPL-SCNC: 4 MMOL/L (ref 3.5–5.3)
PROCALCITONIN SERPL-MCNC: 5.93 NG/ML
PROT SERPL-MCNC: 6 G/DL (ref 6.4–8.4)
RBC # BLD AUTO: 2.34 MILLION/UL (ref 3.88–5.62)
SODIUM SERPL-SCNC: 135 MMOL/L (ref 135–147)
TIBC SERPL-MCNC: 303 UG/DL (ref 250–450)
WBC # BLD AUTO: 10.32 THOUSAND/UL (ref 4.31–10.16)

## 2022-09-01 PROCEDURE — 84145 PROCALCITONIN (PCT): CPT | Performed by: HOSPITALIST

## 2022-09-01 PROCEDURE — 80053 COMPREHEN METABOLIC PANEL: CPT | Performed by: HOSPITALIST

## 2022-09-01 PROCEDURE — 83550 IRON BINDING TEST: CPT | Performed by: HOSPITALIST

## 2022-09-01 PROCEDURE — 99232 SBSQ HOSP IP/OBS MODERATE 35: CPT | Performed by: HOSPITALIST

## 2022-09-01 PROCEDURE — 83540 ASSAY OF IRON: CPT | Performed by: HOSPITALIST

## 2022-09-01 PROCEDURE — 82728 ASSAY OF FERRITIN: CPT | Performed by: HOSPITALIST

## 2022-09-01 PROCEDURE — 85025 COMPLETE CBC W/AUTO DIFF WBC: CPT | Performed by: HOSPITALIST

## 2022-09-01 RX ORDER — FLUTICASONE PROPIONATE 50 MCG
2 SPRAY, SUSPENSION (ML) NASAL DAILY
Status: DISCONTINUED | OUTPATIENT
Start: 2022-09-01 | End: 2022-09-02 | Stop reason: HOSPADM

## 2022-09-01 RX ORDER — SPIRONOLACTONE 25 MG/1
25 TABLET ORAL DAILY
COMMUNITY

## 2022-09-01 RX ORDER — LEVOTHYROXINE SODIUM 0.05 MG/1
50 TABLET ORAL
Status: DISCONTINUED | OUTPATIENT
Start: 2022-09-02 | End: 2022-09-02 | Stop reason: HOSPADM

## 2022-09-01 RX ORDER — LEVOTHYROXINE SODIUM 0.05 MG/1
50 TABLET ORAL
COMMUNITY

## 2022-09-01 RX ORDER — DESVENLAFAXINE 50 MG/1
50 TABLET, EXTENDED RELEASE ORAL DAILY
Status: DISCONTINUED | OUTPATIENT
Start: 2022-09-01 | End: 2022-09-02 | Stop reason: HOSPADM

## 2022-09-01 RX ORDER — DESVENLAFAXINE 50 MG/1
50 TABLET, EXTENDED RELEASE ORAL DAILY
COMMUNITY
End: 2022-09-15 | Stop reason: SDUPTHER

## 2022-09-01 RX ORDER — TORSEMIDE 20 MG/1
20 TABLET ORAL DAILY
COMMUNITY

## 2022-09-01 RX ORDER — FLUTICASONE PROPIONATE 50 MCG
2 SPRAY, SUSPENSION (ML) NASAL DAILY
COMMUNITY

## 2022-09-01 RX ORDER — LANOLIN ALCOHOL/MO/W.PET/CERES
1000 CREAM (GRAM) TOPICAL DAILY
COMMUNITY

## 2022-09-01 RX ADMIN — DOCUSATE SODIUM 100 MG: 100 CAPSULE, LIQUID FILLED ORAL at 18:25

## 2022-09-01 RX ADMIN — DOCUSATE SODIUM 100 MG: 100 CAPSULE, LIQUID FILLED ORAL at 09:18

## 2022-09-01 RX ADMIN — CYANOCOBALAMIN TAB 500 MCG 1000 MCG: 500 TAB at 11:56

## 2022-09-01 RX ADMIN — DESVENLAFAXINE 50 MG: 50 TABLET, FILM COATED, EXTENDED RELEASE ORAL at 11:56

## 2022-09-01 RX ADMIN — HEPARIN SODIUM 5000 UNITS: 5000 INJECTION INTRAVENOUS; SUBCUTANEOUS at 14:10

## 2022-09-01 RX ADMIN — METOPROLOL TARTRATE 25 MG: 25 TABLET, FILM COATED ORAL at 11:56

## 2022-09-01 RX ADMIN — METOPROLOL TARTRATE 25 MG: 25 TABLET, FILM COATED ORAL at 21:22

## 2022-09-01 RX ADMIN — Medication 3.38 G: at 07:53

## 2022-09-01 RX ADMIN — HEPARIN SODIUM 5000 UNITS: 5000 INJECTION INTRAVENOUS; SUBCUTANEOUS at 05:51

## 2022-09-01 RX ADMIN — Medication 3.38 G: at 14:11

## 2022-09-01 RX ADMIN — SENNOSIDES 8.6 MG: 8.6 TABLET, FILM COATED ORAL at 09:17

## 2022-09-01 RX ADMIN — HEPARIN SODIUM 5000 UNITS: 5000 INJECTION INTRAVENOUS; SUBCUTANEOUS at 21:22

## 2022-09-01 RX ADMIN — FLUTICASONE PROPIONATE 2 SPRAY: 50 SPRAY, METERED NASAL at 14:11

## 2022-09-01 RX ADMIN — Medication 3.38 G: at 02:07

## 2022-09-01 RX ADMIN — Medication 3.38 G: at 19:23

## 2022-09-01 NOTE — PLAN OF CARE
Problem: PAIN - ADULT  Goal: Verbalizes/displays adequate comfort level or baseline comfort level  Description: Interventions:  - Encourage patient to monitor pain and request assistance  - Assess pain using appropriate pain scale  - Administer analgesics based on type and severity of pain and evaluate response  - Implement non-pharmacological measures as appropriate and evaluate response  - Consider cultural and social influences on pain and pain management  - Notify physician/advanced practitioner if interventions unsuccessful or patient reports new pain  Outcome: Progressing     Problem: INFECTION - ADULT  Goal: Absence or prevention of progression during hospitalization  Description: INTERVENTIONS:  - Assess and monitor for signs and symptoms of infection  - Monitor lab/diagnostic results  - Monitor all insertion sites, i e  indwelling lines, tubes, and drains  - Monitor endotracheal if appropriate and nasal secretions for changes in amount and color  - [place] appropriate cooling/warming therapies per order  - Administer medications as ordered  - Instruct and encourage patient and family to use good hand hygiene technique  - Identify and instruct in appropriate isolation precautions for identified infection/condition  Outcome: Progressing     Problem: Knowledge Deficit  Goal: Patient/family/caregiver demonstrates understanding of disease process, treatment plan, medications, and discharge instructions  Description: Complete learning assessment and assess knowledge base    Interventions:  - Provide teaching at level of understanding  - Provide teaching via preferred learning methods  Outcome: Progressing     Problem: DISCHARGE PLANNING  Goal: Discharge to home or other facility with appropriate resources  Description: INTERVENTIONS:  - Identify barriers to discharge w/patient and caregiver  - Arrange for needed discharge resources and transportation as appropriate  - Identify discharge learning needs (meds, wound care, etc )  - Arrange for interpretive services to assist at discharge as needed  - Refer to Case Management Department for coordinating discharge planning if the patient needs post-hospital services based on physician/advanced practitioner order or complex needs related to functional status, cognitive ability, or social support system  Outcome: Progressing

## 2022-09-01 NOTE — UTILIZATION REVIEW
Initial Clinical Review    Admission: Date/Time/Statement:   Admission Orders (From admission, onward)     Ordered        08/31/22 1132  INPATIENT ADMISSION  Once                      Orders Placed This Encounter   Procedures    INPATIENT ADMISSION     Standing Status:   Standing     Number of Occurrences:   1     Order Specific Question:   Level of Care     Answer:   Med Surg [16]     Order Specific Question:   Estimated length of stay     Answer:   More than 2 Midnights     Order Specific Question:   Certification     Answer:   I certify that inpatient services are medically necessary for this patient for a duration of greater than two midnights  See H&P and MD Progress Notes for additional information about the patient's course of treatment  ED Arrival Information     Expected   -    Arrival   8/31/2022 10:14    Acuity   Emergent            Means of arrival   Ambulance    [facility]   Hospitalist    Admission type   Emergency            Arrival complaint   -           Chief Complaint   Patient presents with    Altered Mental Status     Patient arrives via EMS, he was at breakfast with his friends and they noted increased confusion  EMS recorded a temp of 103 oral, he was satting at 80% on room air and was incontinent of urine  Patient disoriented to time and situation upon ED arrival        Initial Presentation: 81 y o  male to ED by ems admitted Inpatient d/t LLL pneumonia with sepsis and toxic encephalopathy  presents with  concerns for confusion by his friends  Patient was out to lunch with his friends today  He was noted to be not himself, slower to speak and EMS was called  Patient was noted then to be febrile  incontinent of urine  Patient disoriented to time and situation  O2 sat 80%  WBC 16 34  creat 1 85  IVF, IV antibiotic  avoid nephrotoxins  Date: 9/1   Day 2:Mentation back to baseline  No fever  no dyspnea  thrombocytopenia   Anemic, hgb dropped from 10 1 to 7  9 supplemental o2 weaned to 0 5L  ED Triage Vitals [08/31/22 1026]   Temperature Pulse Respirations Blood Pressure SpO2   100 2 °F (37 9 °C) (!) 108 22 127/60 94 %      Temp Source Heart Rate Source Patient Position - Orthostatic VS BP Location FiO2 (%)   Oral Monitor Lying Right arm --      Pain Score       No Pain          Wt Readings from Last 1 Encounters:   09/01/22 101 kg (222 lb 0 1 oz)     Additional Vital Signs:   08/31/22 2250 -- -- -- -- -- -- 22 0 5 L/min -- --   08/31/22 22:30:29 98 °F (36 7 °C) 82 -- 117/48 Abnormal  71 95 % -- -- -- --   08/31/22 1509 -- 83 -- -- -- 92 % 22 0 5 L/min Nasal cannula --   08/31/22 1402 -- 84 -- -- -- 94 % 22 0 5 L/min Nasal cannula --   08/31/22 1355 -- 83 -- -- -- 90 % -- -- None (Room air) --   08/31/22 1348 -- 87 -- -- -- -- -- -- -- --   08/31/22 12:52:33 98 7 °F (37 1 °C) 91 18 104/51 69 96 % -- -- -- --   08/31/22 1200 -- 87 20 100/54 75 95 % -- -- -- --   08/31/22 1145 -- 89 16 100/53 75 92 % -- -- -- --   08/31/22 1130 -- 96 20 104/53 76 94 % -- -- -- --   08/31/22 1103 -- -- -- -- -- -- -- -- Nasal cannula --   08/31/22 1043 -- -- -- -- -- 80 % Abnormal  -- -- -- --   08/31/22 1042 -- 108 Abnormal  -- -- -- 97 % -- -- -- --   08/31/22 1026 100 2 °F (37 9 °C)  108 Abnormal  22 127/60 86 94 %  -- -- Nasal cannula Lying   Temp: 103 for EMS at 08/31/22 1026   SpO2: 80% on room air per EMS at 08/31/22 1026       Pertinent Labs/Diagnostic Test Results:   8/31 ekg:  Component Ref Range & Units 8/31/22 1031    Ventricular Rate     Atrial Rate     HI Interval ms 162    QRSD Interval ms 134    QT Interval ms 358    QTC Interval ms 482    P Axis degrees 46    QRS Axis degrees -58    T Wave Axis degrees 45          XR chest 1 view portable   Final Result by Aspen Forrest MD (08/31 1152)      Limited by low lung volumes  Left basilar atelectasis                    Workstation performed: [id number]         CT head without contrast   Final Result by Bailey Magdaleno Thomas Heart, DO (08/31 1127)      No acute intracranial abnormality  Chronic microangiopathic changes  Lucency surrounding incompletely visualized right anterolateral maxillary tooth root on image 1  Correlate clinically for infection and/or loosening              Workstation performed: [id number]           Results from last 7 days   Lab Units 08/31/22  1033   SARS-COV-2  Negative     Results from last 7 days   Lab Units 09/01/22  0643 08/31/22  1042 08/31/22  1033   WBC Thousand/uL 10 32*  --  16 34*   HEMOGLOBIN g/dL 7 9*  --  10 1*   I STAT HEMOGLOBIN g/dl  --  10 5*  --    HEMATOCRIT % 24 4*  --  31 5*   HEMATOCRIT, ISTAT %  --  31*  --    PLATELETS Thousands/uL 124*  --  196   NEUTROS ABS Thousands/µL 8 18*  --  14 52*         Results from last 7 days   Lab Units 09/01/22  0550 08/31/22  1042 08/31/22  1033   SODIUM mmol/L 135  --  136   POTASSIUM mmol/L 4 0  --  4 5   CHLORIDE mmol/L 102  --  98   CO2 mmol/L 27  --  32   CO2, I-STAT mmol/L  --  34*  --    ANION GAP mmol/L 6  --  6   BUN mg/dL 33*  --  33*   CREATININE mg/dL 1 81*  --  1 85*   EGFR ml/min/1 73sq m 34  --  33   CALCIUM mg/dL 8 1*  --  8 8   CALCIUM, IONIZED, ISTAT mmol/L  --  1 14  --      Results from last 7 days   Lab Units 09/01/22  0550 08/31/22  1033   AST U/L 58* 51*   ALT U/L 70 58   ALK PHOS U/L 68 82   TOTAL PROTEIN g/dL 6 0* 7 1   ALBUMIN g/dL 2 5* 3 3*   TOTAL BILIRUBIN mg/dL 1 50* 1 00         Results from last 7 days   Lab Units 09/01/22  0550 08/31/22  1033   GLUCOSE RANDOM mg/dL 120 129       Results from last 7 days   Lab Units 08/31/22  1343   PH YAHAIRA  7 385   PCO2 YAHAIRA mm Hg 45 2   PO2 YAHAIRA mm Hg 39 8   HCO3 YAHAIRA mmol/L 26 4   BASE EXC YAHAIRA mmol/L 1 1   O2 CONTENT YAHAIRA ml/dL 10 2   O2 HGB, VENOUS % 68 7     Results from last 7 days   Lab Units 08/31/22  1042   PH, YAHAIRA I-STAT  7 381   PCO2, YAHAIRA ISTAT mm HG 54 4*   PO2, YAHAIRA ISTAT mm HG 22 0*   HCO3, YAHAIRA ISTAT mmol/L 32 2*   I STAT BASE EXC mmol/L 6*   I STAT O2 SAT % 35* Results from last 7 days   Lab Units 08/31/22  1340 08/31/22  1059   HS TNI 0HR ng/L  --  4   HS TNI 2HR ng/L 5  --    HSTNI D2 ng/L 1  --          Results from last 7 days   Lab Units 08/31/22  1033   PROTIME seconds 14 2   INR  1 02   PTT seconds 26         Results from last 7 days   Lab Units 09/01/22  0550 08/31/22  1036   PROCALCITONIN ng/ml 5 93* 2 54*     Results from last 7 days   Lab Units 08/31/22  1340 08/31/22  1036   LACTIC ACID mmol/L 1 3 2 2*       Results from last 7 days   Lab Units 08/31/22  1050   CLARITY UA  Clear   COLOR UA  Yellow   SPEC GRAV UA  1 010   PH UA  6 0   GLUCOSE UA mg/dl Negative   KETONES UA mg/dl Negative   BLOOD UA  Negative   PROTEIN UA mg/dl Negative   NITRITE UA  Negative   BILIRUBIN UA  Negative   UROBILINOGEN UA E U /dl 0 2   LEUKOCYTES UA  Negative     Results from last 7 days   Lab Units 08/31/22  1033   INFLUENZA A PCR  Negative   INFLUENZA B PCR  Negative   RSV PCR  Negative       Results from last 7 days   Lab Units 08/31/22  1048 08/31/22  1036   BLOOD CULTURE  Received in Microbiology Lab  Culture in Progress  Received in Microbiology Lab  Culture in Progress         ED Treatment:   Medication Administration from 08/31/2022 1014 to 08/31/2022 1247       Date/Time Order Dose Route Action Action by Comments                08/31/2022 1124 cefTRIAXone (ROCEPHIN) IVPB (premix in dextrose) 1,000 mg 50 mL 1,000 mg Intravenous New Bag       08/31/2022 1154 azithromycin (ZITHROMAX) 500 mg in sodium chloride 0 9% 250mL IVPB 500 mg 500 mg Intravenous New Bag       08/31/2022 1124 sodium chloride 0 9 % bolus 500 mL 500 mL Intravenous New Bag          Past Medical History:   Diagnosis Date    Arthritis     Disease of thyroid gland     Hypertension     Psychiatric disorder      Present on Admission:  **None**      Admitting Diagnosis: Left lower lobe pneumonia [J18 9]  ALANA (acute kidney injury) ([facility] [address] ) [N17 9]  Acute kidney injury ([facility] [address] ) [N17 9]  Sepsis with acute renal failure without septic shock, due to unspecified organism, unspecified acute renal failure type ([facility] [address] ) [A41 9, R65 20, N17 9]  Age/Sex: 81 y o  male  Admission Orders:  Scheduled Medications:  docusate sodium, 100 mg, Oral, BID  heparin (porcine), 5,000 Units, Subcutaneous, Q8H [address]  piperacillin-tazobactam, 3 375 g, Intravenous, Q6H  polyethylene glycol, 17 g, Oral, Daily  senna, 1 tablet, Oral, Daily      Continuous IV Infusions:  sodium chloride, 125 mL/hr, Intravenous, Continuous      PRN Meds:  acetaminophen, 650 mg, Oral, Q6H PRN  bisacodyl, 10 mg, Rectal, Daily PRN  ondansetron, 4 mg, Intravenous, Q6H PRN    scd  PT/OT  OOB  I&O  DAILY WEIGHTS  REG DIET        Network Utilization Review Department  ATTENTION: Please call with any questions or concerns to [phone] and carefully listen to the prompts so that you are directed to the right person  All voicemails are confidential   [facility] all requests for admission clinical reviews, approved or denied determinations and any other requests to dedicated fax number below belonging to the campus where the patient is receiving treatment   List of dedicated fax numbers for the Facilities:  [address] DENIALS (Administrative/Medical Necessity) [phone]   [address] (Maternity/NICU/Pediatrics) [phone]   [address] [place] [address] [address] [place] [address]  [phone]   [address] [place] [address] [address] [place] [address] Kamila Parikh [address] [phone]

## 2022-09-01 NOTE — NURSING NOTE
Nurse received call from pt's son regarding PTA meds  Son referred nurse to pt saying "Ask my dad about them he'll be able to tell you what he takes at home "  Nurse asked pt about PTA meds and but pt was unable to recall what he takes  Pt stated "I take a blood pressure pill and 2 pee pills  You can call my doctor's office and ask them what I take "  Pt supplied nurse with PCP's phone number and pt agreed to have records faxed over to [employer] so that the nurse can update his PTA med list   Nurse spoke with PCP office and they agreed to fax of updated PTA med list for pt to [facility] fax machine

## 2022-09-01 NOTE — ASSESSMENT & PLAN NOTE
IVF - cap today  Zosyn  Follow blood cultures    repeat lactate 1 3  Monitor supplemental o2 as able to - weaned to 0 5L

## 2022-09-01 NOTE — PLAN OF CARE
Problem: MOBILITY - ADULT  Goal: Maintain or return to baseline ADL function  Description: INTERVENTIONS:  -  Assess patient's ability to carry out ADLs; assess patient's baseline for ADL function and identify physical deficits which impact ability to perform ADLs (bathing, care of mouth/teeth, toileting, grooming, dressing, etc )  - Assess/evaluate cause of self-care deficits   - Assess range of motion  - Assess patient's mobility; develop plan if impaired  - Assess patient's need for assistive devices and provide as appropriate  - Encourage maximum independence but intervene and supervise when necessary  - Involve family in performance of ADLs  - Assess for home care needs following discharge   - Consider OT consult to assist with ADL evaluation and planning for discharge  - Provide patient education as appropriate  Outcome: Progressing  Goal: Maintains/Returns to pre admission functional level  Description: INTERVENTIONS:  - Perform BMAT or MOVE assessment daily    - Set and communicate daily mobility goal to care team and patient/family/caregiver  - Collaborate with rehabilitation services on mobility goals if consulted  - Perform Range of Motion 3 times a day  - Reposition patient every 2 hours    - Dangle patient 3 times a day  - Stand patient 3 times a day  - Ambulate patient 3 times a day  - Out of bed to chair 3 times a day   - Out of bed for meals 3 times a day  - Out of bed for toileting  - Record patient progress and toleration of activity level   Outcome: Progressing     Problem: Potential for Falls  Goal: Patient will remain free of falls  Description: INTERVENTIONS:  - Educate patient/family on patient safety including physical limitations  - Instruct patient to call for assistance with activity   - Consult OT/PT to assist with strengthening/mobility   - Keep Call bell within reach  - Keep bed low and locked with side rails adjusted as appropriate  - Keep care items and personal belongings within reach  - Initiate and maintain comfort rounds  - Make Fall Risk Sign visible to staff  - Offer Toileting every 3 Hours, in advance of need  - Initiate/Maintain alarm  - Obtain necessary fall risk management equipment:   - Apply yellow socks and bracelet for high fall risk patients  - Consider moving patient to room near nurses station  Outcome: Progressing

## 2022-09-01 NOTE — CASE MANAGEMENT
Case Management Assessment & Discharge Planning Note    Patient name Shree Shook  Location [address] [phone]/-[id number] MRN [id number]  : 1940 Date 2022       Current Admission Date: 2022  Current Admission Diagnosis:Sepsis [facility])   Patient Active Problem List    Diagnosis Date Noted    Sepsis ([facility] [address] ) 2022    Toxic metabolic encephalopathy 65/10/5847    Lobar pneumonia ([facility] [address] ) 2022    HTN (hypertension) 2022    Elevated serum creatinine 2022      LOS (days): 1  Geometric Mean LOS (GMLOS) (days):   Days to GMLOS:     OBJECTIVE:    Risk of Unplanned Readmission Score: 11 56         Current admission status: Inpatient       Preferred Pharmacy:   [address] TO E-PRESCRIBE  No address on file      Primary Care Provider: No primary care provider on file  Primary Insurance:   Secondary Insurance:     ASSESSMENT:  Active Health Care Proxies    There are no active Health Care Proxies on file  Advance Directives  Does patient have a [address] [place] [address] Avenue?: No  Was patient offered paperwork?: Yes (declined)  Does patient currently have a Health Care decision maker?: No  Does patient have Advance Directives?: No  Was patient offered paperwork?: Yes (declined)  Primary Contact: Dee Mosley - son         Readmission Root Cause  30 Day Readmission: No    Patient Information  Admitted from[de-identified] Home  Mental Status: Alert  During Assessment patient was accompanied by: Not accompanied during assessment  Assessment information provided by[de-identified] Patient  Primary Caregiver: Self  Support Systems: Son, Daughter, Andria 37 of Residence: [address] [place] [address] do you live in?: [facility] entry access options   Select all that apply : Stairs  Number of steps to enter home : 6  Do the steps have railings?: No  Type of Current Residence: [facility]  In the last 12 months, was there a time when you were not able to pay the mortgage or rent on time?: No  In the last 12 months, how many places have you lived?: 1  In the last 12 months, was there a time when you did not have a steady place to sleep or slept in a shelter (including now)?: No  Homeless/housing insecurity resource given?: N/A  Living Arrangements: Lives Alone  Is patient a ?: No    Activities of Daily Living Prior to Admission  Functional Status: Independent  Completes ADLs independently?: Yes  Ambulates independently?: Yes  Does patient use assisted devices?: No  Does patient currently own DME?: Yes  What DME does the patient currently own?: [employer]  Does patient have a history of Outpatient Therapy (PT/OT)?: No  Does the patient have a history of Short-Term Rehab?: No  Does patient have a history of HHC?: No  Does patient currently have [facility]?: No         Patient Information Continued  Income Source: SSI/SSD  Does patient have prescription coverage?: Yes  Within the past 12 months, you worried that your food would run out before you got the money to buy more : Never true  Within the past 12 months, the food you bought just didn't last and you didn't have money to get more : Never true  Food insecurity resource given?: N/A  Does patient receive dialysis treatments?: No  Does patient have a history of substance abuse?: No  Does patient have a history of Mental Health Diagnosis?: Yes (depression and anxiety)  Is patient receiving treatment for mental health?: Yes (therapist 2x/month at [facility])  Has patient received inpatient treatment related to mental health in the last 2 years?: Yes         Means of Transportation  Means of Transport to Appts[de-identified] Drives Self  In the past 12 months, has lack of transportation kept you from medical appointments or from getting medications?: No  In the past 12 months, has lack of transportation kept you from meetings, work, or from getting things needed for daily living?: No  Was application for public transport provided?: N/A        DISCHARGE DETAILS:    Discharge planning discussed with[de-identified] patient  Freedom of Choice: Yes  Comments - Freedom of Choice: Patient plans on returning home at discharge and does not anticipate any discharge needs  CM contacted family/caregiver?: No- see comments (declined)  Were Treatment Team discharge recommendations reviewed with patient/caregiver?: Yes  Did patient/caregiver verbalize understanding of patient care needs?: Yes  Were patient/caregiver advised of the risks associated with not following Treatment Team discharge recommendations?: Yes         [address]         Is the patient interested in [facility] at discharge?: No    DME Referral Provided  Referral made for DME?: No      Treatment Team Recommendation: Home  Discharge Destination Plan[de-identified] Home  Transport at Discharge : Automobile (Jessica Singleton - [employer] friend)        Additional Comments: Patient lives in a 1 story mobile home with 6 [address]AN[address]  Independent adl's and ambulation, drives  Goes to food pantry, has MOW on Mondays  Sees therapist at [facility] 2x monthly  No services anticipated  Friend will transport home

## 2022-09-01 NOTE — PROGRESS NOTES
[place]  Progress Note Carlie Dunn 1940, 81 y o  male MRN: [id number]  Unit/Bed#: -[facility] Encounter: [id number]  Primary Care Provider: No primary care provider on file  Date and time admitted to hospital: 2022 10:18 AM    Anemia  Assessment & Plan  Hgb dropped to 7 9   Patient also with thrombocytopenia  Monitor for bleeding - none seen  Check iron studies  Maintain >7    Elevated serum creatinine  Assessment & Plan  Cr 1 85 unclear if ALANA or CKD, suspect CKD  Monitor on ivf, avoid nephrotoxins    HTN (hypertension)  Assessment & Plan  Patient unsure of home regimen  Hold     Lobar pneumonia ([address] )  Assessment & Plan  IVF - cap today  Zosyn  Follow blood cultures    repeat lactate 1 3  Monitor supplemental o2 as able to - weaned to 0 5L     Toxic metabolic encephalopathy  Assessment & Plan  2/2 Sepsis  CT head negative  Resolved       * Sepsis ([address] )  Assessment & Plan  2/2 LLL pneumonia, see Lobar pneumonia         VTE  Prophylaxis:   Pharmacologic: in place    Patient Centered Rounds: I have performed bedside rounds with nursing staff today  Discussions with Specialists or Other Care Team Provider: case management    Education and Discussions with Family / Patient: pt      Current Length of Stay: 1 day(s)    Current Patient Status: Inpatient        Code Status: Level 1 - Full Code      Subjective:   Pt seen  Mentation back to baseline  Pt wants to go home  No fevers  States no dyspnea    Patient is seen and examined at bedside  All other ROS are negative  Objective:     Vitals:   Temp (24hrs), Av °F (37 2 °C), Min:98 °F (36 7 °C), Max:100 2 °F (37 9 °C)    Temp:  [98 °F (36 7 °C)-100 2 °F (37 9 °C)] 98 °F (36 7 °C)  HR:  [] 82  Resp:  [16-22] 18  BP: (100-127)/(48-60) 117/48  SpO2:  [80 %-97 %] 95 %  Body mass index is 35 83 kg/m²  Input and Output Summary (last 24 hours):        Intake/Output Summary (Last 24 hours) at 2022 9339  Last data filed at 9/1/2022 0843  Gross per 24 hour   Intake 1590 ml   Output 400 ml   Net 1190 ml       Physical Exam:       GEN: No acute distress, comfortable, on o2  HEEENT: No JVD, PERRLA, no scleral icterus  RESP: Lungs clear to auscultation bilaterally  CV: RRR, +s1/s2   ABD: SOFT NON TENDER, POSITIVE BOWEL SOUNDS, NO DISTENTION  PSYCH: CALM  NEURO: A X O X 3, NO FOCAL DEFICITS  SKIN: NO RASH  EXTREM: NO EDEMA    Additional Data:     Labs:    Results from last 7 days   Lab Units 09/01/22  0643   WBC Thousand/uL 10 32*   HEMOGLOBIN g/dL 7 9*   HEMATOCRIT % 24 4*   PLATELETS Thousands/uL 124*   NEUTROS PCT % 79*   LYMPHS PCT % 8*   MONOS PCT % 12   EOS PCT % 0     Results from last 7 days   Lab Units 09/01/22  0550   SODIUM mmol/L 135   POTASSIUM mmol/L 4 0   CHLORIDE mmol/L 102   CO2 mmol/L 27   BUN mg/dL 33*   CREATININE mg/dL 1 81*   ANION GAP mmol/L 6   CALCIUM mg/dL 8 1*   ALBUMIN g/dL 2 5*   TOTAL BILIRUBIN mg/dL 1 50*   ALK PHOS U/L 68   ALT U/L 70   AST U/L 58*   GLUCOSE RANDOM mg/dL 120     Results from last 7 days   Lab Units 08/31/22  1033   INR  1 02             Results from last 7 days   Lab Units 09/01/22  0550 08/31/22  1340 08/31/22  1036   LACTIC ACID mmol/L  --  1 3 2 2*   PROCALCITONIN ng/ml 5 93*  --  2 54*           * I Have Reviewed All Lab Data Listed Above  Imaging:     Results for orders placed during the hospital encounter of 08/31/22    XR chest 1 view portable    Narrative  CHEST    INDICATION:   r/o PNA  COMPARISON:  None    EXAM PERFORMED/VIEWS:  XR CHEST PORTABLE      FINDINGS:    Cardiomediastinal silhouette is prominent in size  Low inspiratory effort  Left basilar atelectasis  No pneumothorax or pleural effusion  Osseous structures appear within normal limits for patient age  Impression  Limited by low lung volumes  Left basilar atelectasis  Workstation performed: [id number]    No results found for this or any previous visit        *I have reviewed all imaging reports listed above      Recent Cultures (last 7 days):     Results from last 7 days   Lab Units 08/31/22  1048 08/31/22  1036   BLOOD CULTURE  Received in Microbiology Lab  Culture in Progress  Received in Microbiology Lab  Culture in Progress  Last 24 Hours Medication List:   Current Facility-Administered Medications   Medication Dose Route Frequency Provider Last Rate    acetaminophen  650 mg Oral Q6H PRN Ely Harley MD      bisacodyl  10 mg Rectal Daily PRN Ely Harley MD      docusate sodium  100 mg Oral BID Ely Harley MD      heparin (porcine)  5,000 Units Subcutaneous [facility] Ely Harley MD      ondansetron  4 mg Intravenous Q6H PRN Ely Harley MD      piperacillin-tazobactam  3 375 g Intravenous Q6H Ely Harley MD      polyethylene glycol  17 g Oral Daily Ely Harley MD      senna  1 tablet Oral Daily Ely Harley MD      sodium chloride  125 mL/hr Intravenous Continuous Ely Harley  mL/hr (08/31/22 1411)        Today, Patient Was Seen By: Ely Harley MD    ** Please Note: Dictation voice to text software may have been used in the creation of this document   **

## 2022-09-01 NOTE — PLAN OF CARE
Problem: MOBILITY - ADULT  Goal: Maintain or return to baseline ADL function  Description: INTERVENTIONS:  -  Assess patient's ability to carry out ADLs; assess patient's baseline for ADL function and identify physical deficits which impact ability to perform ADLs (bathing, care of mouth/teeth, toileting, grooming, dressing, etc )  - Assess/evaluate cause of self-care deficits   - Assess range of motion  - Assess patient's mobility; develop plan if impaired  - Assess patient's need for assistive devices and provide as appropriate  - Encourage maximum independence but intervene and supervise when necessary  - Involve family in performance of ADLs  - Assess for home care needs following discharge   - Consider OT consult to assist with ADL evaluation and planning for discharge  - Provide patient education as appropriate  Outcome: Progressing  Goal: Maintains/Returns to pre admission functional level  Description: INTERVENTIONS:  - Perform BMAT or MOVE assessment daily    - Set and communicate daily mobility goal to care team and patient/family/caregiver  - Collaborate with rehabilitation services on mobility goals if consulted  - Perform Range of Motion 3 times a day  - Reposition patient every 3 hours    - Dangle patient 3 times a day  - Stand patient 3 times a day  - Ambulate patient 3 times a day  - Out of bed to chair 3 times a day   - Out of bed for meals 3 times a day  - Out of bed for toileting  - Record patient progress and toleration of activity level   Outcome: Progressing     Problem: Potential for Falls  Goal: Patient will remain free of falls  Description: INTERVENTIONS:  - Educate patient/family on patient safety including physical limitations  - Instruct patient to call for assistance with activity   - Consult OT/PT to assist with strengthening/mobility   - Keep Call bell within reach  - Keep bed low and locked with side rails adjusted as appropriate  - Keep care items and personal belongings within reach  - Initiate and maintain comfort rounds  - Make Fall Risk Sign visible to staff  - Offer Toileting every 2 Hours, in advance of need  - Initiate/Maintain alarm  - Obtain necessary fall risk management equipment  - Apply yellow socks and bracelet for high fall risk patients  - Consider moving patient to room near nurses station  Outcome: Progressing

## 2022-09-01 NOTE — PLAN OF CARE
Problem: INFECTION - ADULT  Goal: Absence or prevention of progression during hospitalization  Description: INTERVENTIONS:  - Assess and monitor for signs and symptoms of infection  - Monitor lab/diagnostic results  - Monitor all insertion sites, i e  indwelling lines, tubes, and drains  - Monitor endotracheal if appropriate and nasal secretions for changes in amount and color  - Bend appropriate cooling/warming therapies per order  - Administer medications as ordered  - Instruct and encourage patient and family to use good hand hygiene technique  - Identify and instruct in appropriate isolation precautions for identified infection/condition  9/1/2022 1843 by Percy Chavez RN  Outcome: Progressing  9/1/2022 1839 by Percy Chavez RN  Outcome: Progressing     Problem: DISCHARGE PLANNING  Goal: Discharge to home or other facility with appropriate resources  Description: INTERVENTIONS:  - Identify barriers to discharge w/patient and caregiver  - Arrange for needed discharge resources and transportation as appropriate  - Identify discharge learning needs (meds, wound care, etc )  - Arrange for interpretive services to assist at discharge as needed  - Refer to Case Management Department for coordinating discharge planning if the patient needs post-hospital services based on physician/advanced practitioner order or complex needs related to functional status, cognitive ability, or social support system  9/1/2022 1843 by Percy Chavez RN  Outcome: Progressing  9/1/2022 1839 by Percy Chavez RN  Outcome: Progressing     Problem: Knowledge Deficit  Goal: Patient/family/caregiver demonstrates understanding of disease process, treatment plan, medications, and discharge instructions  Description: Complete learning assessment and assess knowledge base    Interventions:  - Provide teaching at level of understanding  - Provide teaching via preferred learning methods  9/1/2022 1843 by Percy Chavez RN  Outcome: Progressing  9/1/2022 1839 by Chad Leon RN  Outcome: Progressing

## 2022-09-01 NOTE — ASSESSMENT & PLAN NOTE
Hgb dropped to 7 9   Patient also with thrombocytopenia  Monitor for bleeding - none seen  Check iron studies  Maintain >7

## 2022-09-01 NOTE — UTILIZATION REVIEW
Initial Clinical Review    Admission: Date/Time/Statement:   Admission Orders (From admission, onward)     Ordered        08/31/22 1132  INPATIENT ADMISSION  Once                      Orders Placed This Encounter   Procedures    INPATIENT ADMISSION     Standing Status:   Standing     Number of Occurrences:   1     Order Specific Question:   Level of Care     Answer:   Med Surg [16]     Order Specific Question:   Estimated length of stay     Answer:   More than 2 Midnights     Order Specific Question:   Certification     Answer:   I certify that inpatient services are medically necessary for this patient for a duration of greater than two midnights  See H&P and MD Progress Notes for additional information about the patient's course of treatment  ED Arrival Information     Expected   -    Arrival   8/31/2022 10:14    Acuity   Emergent            Means of arrival   Ambulance    Escorted by   Alanna Mckeon    Service   Hospitalist    Admission type   Emergency            Arrival complaint   -           Chief Complaint   Patient presents with    Altered Mental Status     Patient arrives via EMS, he was at breakfast with his friends and they noted increased confusion  EMS recorded a temp of 103 oral, he was satting at 80% on room air and was incontinent of urine  Patient disoriented to time and situation upon ED arrival        Initial Presentation: 81 y o  male to ED by ems admitted Inpatient d/t LLL pneumonia with sepsis and toxic encephalopathy  presents with  concerns for confusion by his friends  Patient was out to lunch with his friends today  He was noted to be not himself, slower to speak and EMS was called  Patient was noted then to be febrile  incontinent of urine  Patient disoriented to time and situation  O2 sat 80%  WBC 16 34  creat 1 85  IVF, IV antibiotic  avoid nephrotoxins  Date: 9/1   Day 2:Mentation back to baseline  No fever  no dyspnea  thrombocytopenia   Anemic, hgb dropped from 10 1 to 7  9 supplemental o2 weaned to 0 5L  ED Triage Vitals [08/31/22 1026]   Temperature Pulse Respirations Blood Pressure SpO2   100 2 °F (37 9 °C) (!) 108 22 127/60 94 %      Temp Source Heart Rate Source Patient Position - Orthostatic VS BP Location FiO2 (%)   Oral Monitor Lying Right arm --      Pain Score       No Pain          Wt Readings from Last 1 Encounters:   09/01/22 101 kg (222 lb 0 1 oz)     Additional Vital Signs:   08/31/22 2250 -- -- -- -- -- -- 22 0 5 L/min -- --   08/31/22 22:30:29 98 °F (36 7 °C) 82 -- 117/48 Abnormal  71 95 % -- -- -- --   08/31/22 1509 -- 83 -- -- -- 92 % 22 0 5 L/min Nasal cannula --   08/31/22 1402 -- 84 -- -- -- 94 % 22 0 5 L/min Nasal cannula --   08/31/22 1355 -- 83 -- -- -- 90 % -- -- None (Room air) --   08/31/22 1348 -- 87 -- -- -- -- -- -- -- --   08/31/22 12:52:33 98 7 °F (37 1 °C) 91 18 104/51 69 96 % -- -- -- --   08/31/22 1200 -- 87 20 100/54 75 95 % -- -- -- --   08/31/22 1145 -- 89 16 100/53 75 92 % -- -- -- --   08/31/22 1130 -- 96 20 104/53 76 94 % -- -- -- --   08/31/22 1103 -- -- -- -- -- -- -- -- Nasal cannula --   08/31/22 1043 -- -- -- -- -- 80 % Abnormal  -- -- -- --   08/31/22 1042 -- 108 Abnormal  -- -- -- 97 % -- -- -- --   08/31/22 1026 100 2 °F (37 9 °C)  108 Abnormal  22 127/60 86 94 %  -- -- Nasal cannula Lying   Temp: 103 for EMS at 08/31/22 1026   SpO2: 80% on room air per EMS at 08/31/22 1026       Pertinent Labs/Diagnostic Test Results:   8/31 ekg:  Component Ref Range & Units 8/31/22 1031    Ventricular Rate     Atrial Rate     CA Interval ms 162    QRSD Interval ms 134    QT Interval ms 358    QTC Interval ms 482    P Axis degrees 46    QRS Axis degrees -58    T Wave Axis degrees 45          XR chest 1 view portable   Final Result by Landen Chavez MD (08/31 1152)      Limited by low lung volumes  Left basilar atelectasis                    Workstation performed: [id number]         CT head without contrast   Final Result by Pablo Stevens Gadiel RodríguezsDO (08/31 1127)      No acute intracranial abnormality  Chronic microangiopathic changes  Lucency surrounding incompletely visualized right anterolateral maxillary tooth root on image 1  Correlate clinically for infection and/or loosening              Workstation performed: [id number]           Results from last 7 days   Lab Units 08/31/22  1033   SARS-COV-2  Negative     Results from last 7 days   Lab Units 09/01/22  0643 08/31/22  1042 08/31/22  1033   WBC Thousand/uL 10 32*  --  16 34*   HEMOGLOBIN g/dL 7 9*  --  10 1*   I STAT HEMOGLOBIN g/dl  --  10 5*  --    HEMATOCRIT % 24 4*  --  31 5*   HEMATOCRIT, ISTAT %  --  31*  --    PLATELETS Thousands/uL 124*  --  196   NEUTROS ABS Thousands/µL 8 18*  --  14 52*         Results from last 7 days   Lab Units 09/01/22  0550 08/31/22  1042 08/31/22  1033   SODIUM mmol/L 135  --  136   POTASSIUM mmol/L 4 0  --  4 5   CHLORIDE mmol/L 102  --  98   CO2 mmol/L 27  --  32   CO2, I-STAT mmol/L  --  34*  --    ANION GAP mmol/L 6  --  6   BUN mg/dL 33*  --  33*   CREATININE mg/dL 1 81*  --  1 85*   EGFR ml/min/1 73sq m 34  --  33   CALCIUM mg/dL 8 1*  --  8 8   CALCIUM, IONIZED, ISTAT mmol/L  --  1 14  --      Results from last 7 days   Lab Units 09/01/22  0550 08/31/22  1033   AST U/L 58* 51*   ALT U/L 70 58   ALK PHOS U/L 68 82   TOTAL PROTEIN g/dL 6 0* 7 1   ALBUMIN g/dL 2 5* 3 3*   TOTAL BILIRUBIN mg/dL 1 50* 1 00         Results from last 7 days   Lab Units 09/01/22  0550 08/31/22  1033   GLUCOSE RANDOM mg/dL 120 129       Results from last 7 days   Lab Units 08/31/22  1343   PH YAHAIRA  7 385   PCO2 YAHAIRA mm Hg 45 2   PO2 YAHAIRA mm Hg 39 8   HCO3 YAHAIRA mmol/L 26 4   BASE EXC YAHAIRA mmol/L 1 1   O2 CONTENT YAHAIRA ml/dL 10 2   O2 HGB, VENOUS % 68 7     Results from last 7 days   Lab Units 08/31/22  1042   PH, YAHAIRA I-STAT  7 381   PCO2, YAHAIRA ISTAT mm HG 54 4*   PO2, YAHAIRA ISTAT mm HG 22 0*   HCO3, YAHAIRA ISTAT mmol/L 32 2*   I STAT BASE EXC mmol/L 6*   I STAT O2 SAT % 35* Results from last 7 days   Lab Units 08/31/22  1340 08/31/22  1059   HS TNI 0HR ng/L  --  4   HS TNI 2HR ng/L 5  --    HSTNI D2 ng/L 1  --          Results from last 7 days   Lab Units 08/31/22  1033   PROTIME seconds 14 2   INR  1 02   PTT seconds 26         Results from last 7 days   Lab Units 09/01/22  0550 08/31/22  1036   PROCALCITONIN ng/ml 5 93* 2 54*     Results from last 7 days   Lab Units 08/31/22  1340 08/31/22  1036   LACTIC ACID mmol/L 1 3 2 2*       Results from last 7 days   Lab Units 08/31/22  1050   CLARITY UA  Clear   COLOR UA  Yellow   SPEC GRAV UA  1 010   PH UA  6 0   GLUCOSE UA mg/dl Negative   KETONES UA mg/dl Negative   BLOOD UA  Negative   PROTEIN UA mg/dl Negative   NITRITE UA  Negative   BILIRUBIN UA  Negative   UROBILINOGEN UA E U /dl 0 2   LEUKOCYTES UA  Negative     Results from last 7 days   Lab Units 08/31/22  1033   INFLUENZA A PCR  Negative   INFLUENZA B PCR  Negative   RSV PCR  Negative       Results from last 7 days   Lab Units 08/31/22  1048 08/31/22  1036   BLOOD CULTURE  No Growth at 24 hrs  No Growth at 24 hrs         ED Treatment:   Medication Administration from 08/31/2022 1014 to 08/31/2022 1247       Date/Time Order Dose Route Action Action by Comments                08/31/2022 1124 cefTRIAXone (ROCEPHIN) IVPB (premix in dextrose) 1,000 mg 50 mL 1,000 mg Intravenous New Bag       08/31/2022 1154 azithromycin (ZITHROMAX) 500 mg in sodium chloride 0 9% 250mL IVPB 500 mg 500 mg Intravenous New Bag       08/31/2022 1124 sodium chloride 0 9 % bolus 500 mL 500 mL Intravenous New Bag          Past Medical History:   Diagnosis Date    Arthritis     Disease of thyroid gland     Hypertension     Psychiatric disorder      Present on Admission:  **None**      Admitting Diagnosis: Left lower lobe pneumonia [J18 9]  ALANA (acute kidney injury) ([facility] [address] ) [N17 9]  Acute kidney injury ([facility] [address] ) [N17 9]  Sepsis with acute renal failure without septic shock, due to unspecified organism, unspecified [facility] [A41 9, R65 20, N17 9]  Age/Sex: 81 y o  male  Admission Orders:  Scheduled Medications:  docusate sodium, 100 mg, Oral, BID  heparin (porcine), 5,000 Units, Subcutaneous, Q8H [address]  piperacillin-tazobactam, 3 375 g, Intravenous, Q6H  polyethylene glycol, 17 g, Oral, Daily  senna, 1 tablet, Oral, Daily      Continuous IV Infusions:  sodium chloride, 125 mL/hr, Intravenous, Continuous      PRN Meds:  acetaminophen, 650 mg, Oral, Q6H PRN  bisacodyl, 10 mg, Rectal, Daily PRN  ondansetron, 4 mg, Intravenous, Q6H PRN    scd  PT/OT  OOB  I&O  DAILY WEIGHTS  REG DIET        Network Utilization Review Department  ATTENTION: Please call with any questions or concerns to [phone] and carefully listen to the prompts so that you are directed to the right person  All voicemails are confidential   Sudie Crigler all requests for admission clinical reviews, approved or denied determinations and any other requests to dedicated fax number below belonging to the campus where the patient is receiving treatment   List of dedicated fax numbers for the Facilities:  [address] DENIALS (Administrative/Medical Necessity) [phone]   [address] (Maternity/NICU/Pediatrics) [phone]   [address]  [address] [facility] [place] [address] [id number] [place] [address] [address] [facility] [address] [facility] [phone]

## 2022-09-02 VITALS
WEIGHT: 224.1 LBS | BODY MASS INDEX: 36.02 KG/M2 | OXYGEN SATURATION: 93 % | TEMPERATURE: 97.9 F | DIASTOLIC BLOOD PRESSURE: 65 MMHG | RESPIRATION RATE: 18 BRPM | SYSTOLIC BLOOD PRESSURE: 117 MMHG | HEIGHT: 66 IN | HEART RATE: 67 BPM

## 2022-09-02 LAB
ALBUMIN SERPL BCP-MCNC: 2.5 G/DL (ref 3.5–5)
ALP SERPL-CCNC: 79 U/L (ref 46–116)
ALT SERPL W P-5'-P-CCNC: 63 U/L (ref 12–78)
ANION GAP SERPL CALCULATED.3IONS-SCNC: 6 MMOL/L (ref 4–13)
AST SERPL W P-5'-P-CCNC: 40 U/L (ref 5–45)
ATRIAL RATE: 109 BPM
BASOPHILS # BLD AUTO: 0.01 THOUSANDS/ΜL (ref 0–0.1)
BASOPHILS NFR BLD AUTO: 0 % (ref 0–1)
BILIRUB SERPL-MCNC: 0.6 MG/DL (ref 0.2–1)
BUN SERPL-MCNC: 29 MG/DL (ref 5–25)
CALCIUM ALBUM COR SERPL-MCNC: 9.4 MG/DL (ref 8.3–10.1)
CALCIUM SERPL-MCNC: 8.2 MG/DL (ref 8.3–10.1)
CHLORIDE SERPL-SCNC: 103 MMOL/L (ref 96–108)
CO2 SERPL-SCNC: 27 MMOL/L (ref 21–32)
CREAT SERPL-MCNC: 1.68 MG/DL (ref 0.6–1.3)
EOSINOPHIL # BLD AUTO: 0.03 THOUSAND/ΜL (ref 0–0.61)
EOSINOPHIL NFR BLD AUTO: 0 % (ref 0–6)
ERYTHROCYTE [DISTWIDTH] IN BLOOD BY AUTOMATED COUNT: 20.6 % (ref 11.6–15.1)
GFR SERPL CREATININE-BSD FRML MDRD: 37 ML/MIN/1.73SQ M
GLUCOSE SERPL-MCNC: 124 MG/DL (ref 65–140)
HCT VFR BLD AUTO: 24 % (ref 36.5–49.3)
HGB BLD-MCNC: 7.6 G/DL (ref 12–17)
IMM GRANULOCYTES # BLD AUTO: 0.05 THOUSAND/UL (ref 0–0.2)
IMM GRANULOCYTES NFR BLD AUTO: 1 % (ref 0–2)
LYMPHOCYTES # BLD AUTO: 0.92 THOUSANDS/ΜL (ref 0.6–4.47)
LYMPHOCYTES NFR BLD AUTO: 10 % (ref 14–44)
MCH RBC QN AUTO: 34.4 PG (ref 26.8–34.3)
MCHC RBC AUTO-ENTMCNC: 31.7 G/DL (ref 31.4–37.4)
MCV RBC AUTO: 109 FL (ref 82–98)
MONOCYTES # BLD AUTO: 0.77 THOUSAND/ΜL (ref 0.17–1.22)
MONOCYTES NFR BLD AUTO: 9 % (ref 4–12)
NEUTROPHILS # BLD AUTO: 7.29 THOUSANDS/ΜL (ref 1.85–7.62)
NEUTS SEG NFR BLD AUTO: 80 % (ref 43–75)
NRBC BLD AUTO-RTO: 0 /100 WBCS
P AXIS: 46 DEGREES
PLATELET # BLD AUTO: 136 THOUSANDS/UL (ref 149–390)
PMV BLD AUTO: 12.9 FL (ref 8.9–12.7)
POTASSIUM SERPL-SCNC: 3.8 MMOL/L (ref 3.5–5.3)
PR INTERVAL: 162 MS
PROT SERPL-MCNC: 6.2 G/DL (ref 6.4–8.4)
QRS AXIS: -58 DEGREES
QRSD INTERVAL: 134 MS
QT INTERVAL: 358 MS
QTC INTERVAL: 482 MS
RBC # BLD AUTO: 2.21 MILLION/UL (ref 3.88–5.62)
SODIUM SERPL-SCNC: 136 MMOL/L (ref 135–147)
T WAVE AXIS: 45 DEGREES
VENTRICULAR RATE: 109 BPM
WBC # BLD AUTO: 9.07 THOUSAND/UL (ref 4.31–10.16)

## 2022-09-02 PROCEDURE — 99239 HOSP IP/OBS DSCHRG MGMT >30: CPT | Performed by: HOSPITALIST

## 2022-09-02 PROCEDURE — 93010 ELECTROCARDIOGRAM REPORT: CPT | Performed by: INTERNAL MEDICINE

## 2022-09-02 PROCEDURE — 85025 COMPLETE CBC W/AUTO DIFF WBC: CPT | Performed by: HOSPITALIST

## 2022-09-02 PROCEDURE — 80053 COMPREHEN METABOLIC PANEL: CPT | Performed by: HOSPITALIST

## 2022-09-02 RX ORDER — DOXYCYCLINE HYCLATE 100 MG/1
100 CAPSULE ORAL EVERY 12 HOURS SCHEDULED
Qty: 10 CAPSULE | Refills: 0 | Status: SHIPPED | OUTPATIENT
Start: 2022-09-02 | End: 2022-09-07

## 2022-09-02 RX ORDER — CEPHALEXIN 500 MG/1
500 CAPSULE ORAL EVERY 6 HOURS SCHEDULED
Qty: 20 CAPSULE | Refills: 0 | Status: SHIPPED | OUTPATIENT
Start: 2022-09-02 | End: 2022-09-02

## 2022-09-02 RX ADMIN — POLYETHYLENE GLYCOL 3350 17 G: 17 POWDER, FOR SOLUTION ORAL at 09:51

## 2022-09-02 RX ADMIN — CYANOCOBALAMIN TAB 500 MCG 1000 MCG: 500 TAB at 09:50

## 2022-09-02 RX ADMIN — Medication 3.38 G: at 01:10

## 2022-09-02 RX ADMIN — Medication 3.38 G: at 06:34

## 2022-09-02 RX ADMIN — LEVOTHYROXINE SODIUM 50 MCG: 50 TABLET ORAL at 06:34

## 2022-09-02 RX ADMIN — METOPROLOL TARTRATE 25 MG: 25 TABLET, FILM COATED ORAL at 09:51

## 2022-09-02 RX ADMIN — FLUTICASONE PROPIONATE 2 SPRAY: 50 SPRAY, METERED NASAL at 01:44

## 2022-09-02 RX ADMIN — HEPARIN SODIUM 5000 UNITS: 5000 INJECTION INTRAVENOUS; SUBCUTANEOUS at 06:22

## 2022-09-02 RX ADMIN — FLUTICASONE PROPIONATE 2 SPRAY: 50 SPRAY, METERED NASAL at 09:51

## 2022-09-02 RX ADMIN — DESVENLAFAXINE 50 MG: 50 TABLET, FILM COATED, EXTENDED RELEASE ORAL at 09:51

## 2022-09-02 RX ADMIN — SENNOSIDES 8.6 MG: 8.6 TABLET, FILM COATED ORAL at 09:51

## 2022-09-02 RX ADMIN — DOCUSATE SODIUM 100 MG: 100 CAPSULE, LIQUID FILLED ORAL at 09:50

## 2022-09-02 NOTE — PLAN OF CARE
Problem: MOBILITY - ADULT  Goal: Maintain or return to baseline ADL function  Description: INTERVENTIONS:  -  Assess patient's ability to carry out ADLs; assess patient's baseline for ADL function and identify physical deficits which impact ability to perform ADLs (bathing, care of mouth/teeth, toileting, grooming, dressing, etc )  - Assess/evaluate cause of self-care deficits   - Assess range of motion  - Assess patient's mobility; develop plan if impaired  - Assess patient's need for assistive devices and provide as appropriate  - Encourage maximum independence but intervene and supervise when necessary  - Involve family in performance of ADLs  - Assess for home care needs following discharge   - Consider OT consult to assist with ADL evaluation and planning for discharge  - Provide patient education as appropriate  Outcome: Progressing  Goal: Maintains/Returns to pre admission functional level  Description: INTERVENTIONS:  - Perform BMAT or MOVE assessment daily    - Set and communicate daily mobility goal to care team and patient/family/caregiver  - Collaborate with rehabilitation services on mobility goals if consulted  - Perform Range of Motion 2 times a day  - Reposition patient every 2 hours    - Dangle patient 2 times a day  - Stand patient 2 times a day  - Ambulate patient 2 times a day  - Out of bed to chair 2 times a day   - Out of bed for meals 2  Problem: Potential for Falls  Goal: Patient will remain free of falls  Description: INTERVENTIONS:  - Educate patient/family on patient safety including physical limitations  - Instruct patient to call for assistance with activity   - Consult OT/PT to assist with strengthening/mobility   - Keep Call bell within reach  - Keep bed low and locked with side rails adjusted as appropriate  - Keep care items and personal belongings within reach  - Initiate and maintain comfort rounds  - Make Fall Risk Sign visible to staff  - Offer Toileting every 2 Hours, in advance of need  - Initiate/Maintain bed alarm  - Obtain necessary fall risk management equipment: socks  Problem: PAIN - ADULT  Goal: Verbalizes/displays adequate comfort level or baseline comfort level  Description: Interventions:  - Encourage patient to monitor pain and request assistance  - Assess pain using appropriate pain scale  - Administer analgesics based on type and severity of pain and evaluate response  - Implement non-pharmacological measures as appropriate and evaluate response  - Consider cultural and social influences on pain and pain management  - Notify physician/advanced practitioner if interventions unsuccessful or patient reports new pain  Outcome: Progressing     Problem: INFECTION - ADULT  Goal: Absence or prevention of progression during hospitalization  Description: INTERVENTIONS:  - Assess and monitor for signs and symptoms of infection  - Monitor lab/diagnostic results  - Monitor all insertion sites, i e  indwelling lines, tubes, and drains  - Monitor endotracheal if appropriate and nasal secretions for changes in amount and color  - Herod appropriate cooling/warming therapies per order  - Administer medications as ordered  - Instruct and encourage patient and family to use good hand hygiene technique  - Identify and instruct in appropriate isolation precautions for identified infection/condition  Outcome: Progressing  Goal: Absence of fever/infection during neutropenic period  Description: INTERVENTIONS:  - Monitor WBC    Outcome: Progressing     Problem: SAFETY ADULT  Goal: Maintain or return to baseline ADL function  Description: INTERVENTIONS:  -  Assess patient's ability to carry out ADLs; assess patient's baseline for ADL function and identify physical deficits which impact ability to perform ADLs (bathing, care of mouth/teeth, toileting, grooming, dressing, etc )  - Assess/evaluate cause of self-care deficits   - Assess range of motion  - Assess patient's mobility; develop plan if impaired  - Assess patient's need for assistive devices and provide as appropriate  - Encourage maximum independence but intervene and supervise when necessary  - Involve family in performance of ADLs  - Assess for home care needs following discharge   - Consider OT consult to assist with ADL evaluation and planning for discharge  - Provide patient education as appropriate  Outcome: Progressing  Goal: Maintains/Returns to pre admission functional level  Description: INTERVENTIONS:  - Perform BMAT or MOVE assessment daily    - Set and communicate daily mobility goal to care team and patient/family/caregiver  - Collaborate with rehabilitation services on mobility goals if consulted  - Perform Range of Motion 2 times a day  - Reposition patient every 2 hours  - Dangle patient 2 times a day  - Stand patient 2 times a day  - Ambulate patient 2 times a day  - Out of bed to chair 2 times a day   - Out of bed for meals 2 times a day  - Out of bed for toileting  - Record patient progress and toleration of activity level   Outcome: Progressing  Goal: Patient will remain free of falls  Description: INTERVENTIONS:  - Educate patient/family on patient safety including physical limitations  - Instruct patient to call for assistance with activity   - Consult OT/PT to assist with strengthening/mobility   - Keep Call bell within reach  - Keep bed low and locked with side rails adjusted as appropriate  - Keep care items and personal belongings within reach  - Initiate and maintain comfort rounds  - Make Fall Risk Sign visible to staff  - Offer Toileting every 2 Hours, in advance of need  - Initiate/Maintain bed alarm  - Obtain necessary fall risk management equipment: socks  Problem: Knowledge Deficit  Goal: Patient/family/caregiver demonstrates understanding of disease process, treatment plan, medications, and discharge instructions  Description: Complete learning assessment and assess knowledge base    Interventions:  - Provide teaching at level of understanding  - Provide teaching via preferred learning methods  Outcome: Progressing     - Apply yellow socks and bracelet for high fall risk patients  - Consider moving patient to room near nurses station  Outcome: Progressing     - Apply yellow socks and bracelet for high fall risk patients  - Consider moving patient to room near nurses station  Outcome: Progressing    times a day  - Out of bed for toileting  - Record patient progress and toleration of activity level   Outcome: Progressing

## 2022-09-02 NOTE — ASSESSMENT & PLAN NOTE
Hgb dropped to 7 9   Patient also with thrombocytopenia, which is improved, may have been attributed to sepsis    Monitor for bleeding - none seen  Anemia of chronic disease - pt instructed to f/u with PCP  Maintain >7

## 2022-09-02 NOTE — DISCHARGE SUMMARY
[place]  Discharge- Senait Valentine 1940, 81 y o  male MRN: [id number]  Unit/Bed#: -01 Encounter: [id number]  Primary Care Provider: No primary care provider on file  Date and time admitted to hospital: 8/31/2022 10:18 AM    Anemia  Assessment & Plan  Hgb dropped to 7 9   Patient also with thrombocytopenia, which is improved, may have been attributed to sepsis  Monitor for bleeding - none seen  Anemia of chronic disease - pt instructed to f/u with PCP  Maintain >7    Elevated serum creatinine  Assessment & Plan  Cr 1 85 unclear if ALANA or CKD, suspect CKD  Cr 1 6 today  Monitor on ivf, avoid nephrotoxins    HTN (hypertension)  Assessment & Plan  Patient unsure of home regimen  meds confirmed - resume at dc  Lobar pneumonia ([facility] [address] )  Assessment & Plan  Completed ivf  Zosyn-> doxy  Neg  blood cultures    repeat lactate 1 3  Weaned off o2    Toxic metabolic encephalopathy  Assessment & Plan  2/2 Sepsis  CT head negative  Resolved 9/1      * Sepsis ([facility] [address] )  Assessment & Plan  2/2 LLL pneumonia, see Lobar pneumonia  resolved     Hospital Course:     Senait Valentine is a 81 y o  male patient who originally presented to the hospital on   Admission Orders (From admission, onward)     Ordered        08/31/22 1132  1 [facility]  Once                     due to    toxic metabolic encephalopathy  Patient was found to be septic from lobar pneumonia  Patient was treated with intravenous Zosyn and will complete a course of doxy at discharge  He is at baseline at the time of discharge from the hospital   He has no complaints  He is instructed to follow up with his PCP regarding anemia of chronic disease  Please see above list of diagnoses and related plan for additional information       Physical Exam:    GEN: No acute distress, comfortable  HEEENT: No JVD, PERRLA, no scleral icterus  RESP: Lungs clear to auscultation bilaterally  CV: RRR, +s1/s2   ABD: SOFT NON TENDER, POSITIVE BOWEL SOUNDS, NO DISTENTION  PSYCH: CALM  NEURO: A X O X 3, NO FOCAL DEFICITS  SKIN: NO RASH  EXTREM: NO EDEMA      Condition at Discharge:  good      Discharge instructions/Information to patient and family:   See after visit summary for information provided to patient and family  Provisions for Follow-Up Care:  See after visit summary for information related to follow-up care and any pertinent home health orders  Disposition:     Home       Discharge Statement:  I spent 37 minutes discharging the patient  This time was spent on the day of discharge  I had direct contact with the patient on the day of discharge  Greater than 50% of the total time was spent examining patient, answering all patient questions, arranging and discussing plan of care with patient as well as directly providing post-discharge instructions  Additional time then spent on discharge activities  Discharge Medications:  See after visit summary for reconciled discharge medications provided to patient and family        ** Please Note: This note has been constructed using a voice recognition system **

## 2022-09-02 NOTE — UTILIZATION REVIEW
Notification of Discharge   This is a Notification of Discharge from our facility [address]  Please be advised that this patient has been discharge from our facility  Below you will find the admission and discharge date and time including the patients disposition  UTILIZATION REVIEW CONTACT:  Ale Castillo  Utilization   Network Utilization Review Department  Phone: [phone] carefully listen to the prompts  All voicemails are confidential   Email: [email or web address]     PHYSICIAN ADVISORY SERVICES:  FOR [id number]-[id number] REVIEW - MEDICAL NECESSITY DENIAL  Phone: [phone]  Fax: [phone]  Email: [email or web address]     PRESENTATION DATE: 8/31/2022 10:18 AM  OBERVATION ADMISSION DATE:   INPATIENT ADMISSION DATE: 8/31/22 11:32 AM   DISCHARGE DATE: 9/2/2022 11:33 AM  DISPOSITION: Home/Self Care Home/Self Care      IMPORTANT INFORMATION:  Send all requests for admission clinical reviews, approved or denied determinations and any other requests to dedicated fax number below belonging to the campus where the patient is receiving treatment   List of dedicated fax numbers:  [address] DENIALS (Administrative/Medical Necessity) [phone]   [address] (Maternity/NICU/Pediatrics) [phone]   Umeshlaura Keei [phone]   130 [facility] [phone]   [address] [place] [address] [phone]   [address] [phone]   [facility]  [phone]   2205 [facility], [address]  2401 [facility] 1000 [address] [facility] [phone]

## 2022-09-04 LAB
BACTERIA BLD CULT: NORMAL
BACTERIA BLD CULT: NORMAL

## 2022-09-05 LAB
BACTERIA BLD CULT: NORMAL
BACTERIA BLD CULT: NORMAL

## 2022-09-08 ENCOUNTER — SOCIAL WORK (OUTPATIENT)
Dept: BEHAVIORAL/MENTAL HEALTH CLINIC | Facility: CLINIC | Age: 82
End: 2022-09-08
Payer: COMMERCIAL

## 2022-09-08 DIAGNOSIS — F41.1 GENERALIZED ANXIETY DISORDER: Primary | ICD-10-CM

## 2022-09-08 PROCEDURE — 90834 PSYTX W PT 45 MINUTES: CPT | Performed by: PSYCHOLOGIST

## 2022-09-08 NOTE — PSYCH
Psychotherapy Provided: Individual Psychotherapy 45 minutes     Length of time in session: 45 minutes, follow up in 2 weeks    Encounter Diagnosis     ICD-10-CM    1  Generalized anxiety disorder  F41 1        Goals addressed in session: Goal 1     Pain:      none    1    Current suicide risk : Low     D- Client went to hospital, spent two nights  He is confused, doesn't know why he went, what is wrong with him  His two children visited him, stayed with him  Doctor said he is "a quick healer",  And his friends from [employer] came and cheered him up They prayed for him, as did his whole [employer]  His son gave him a "big hug, he felt the love  Client admits he doesn't eat right, will look into this with his doctor  A- Client admits he is a "people person", doesn't feel good if he isn't able to be around people  He is looking forward to going on a trip with the Sen[employer] in his [employer], needs to feel supported by this, and others in his life  P- Client's poor eating habits may have contributed to his recent hospital stay, he will look into nutritional help  Client needs support and encouragement, to make good choices, and will report back to therapist in next session  Behavioral Health Treatment Plan [facility]: Diagnosis and Treatment Plan explained to Bailey Vaughn relates understanding diagnosis and is agreeable to [employer]   Yes

## 2022-09-11 PROBLEM — F41.1 GENERALIZED ANXIETY DISORDER: Status: ACTIVE | Noted: 2022-09-11

## 2022-09-15 ENCOUNTER — OFFICE VISIT (OUTPATIENT)
Dept: PSYCHIATRY | Facility: CLINIC | Age: 82
End: 2022-09-15
Payer: COMMERCIAL

## 2022-09-15 DIAGNOSIS — F41.1 GENERALIZED ANXIETY DISORDER: Primary | ICD-10-CM

## 2022-09-15 PROBLEM — F32.4 MAJOR DEPRESSION IN PARTIAL REMISSION (HCC): Status: ACTIVE | Noted: 2022-09-15

## 2022-09-15 PROCEDURE — 99213 OFFICE O/P EST LOW 20 MIN: CPT | Performed by: NURSE PRACTITIONER

## 2022-09-15 RX ORDER — DESVENLAFAXINE 50 MG/1
50 TABLET, EXTENDED RELEASE ORAL DAILY
Qty: 30 TABLET | Refills: 1 | Status: SHIPPED | OUTPATIENT
Start: 2022-09-15 | End: 2022-10-13 | Stop reason: SDUPTHER

## 2022-09-15 NOTE — PSYCH
Psychiatric Medication Management - 400 [facility] 81 y o  male MRN: [id number]      This note was not shared with the patient due to reasonable likelihood of causing patient harm      Reason for Visit: med check    Subjective: Pt observed on Pristiq 50 mg am for depression and anxiety  He tolerates it well  Pt says he was admitted to [facility]  with pneumonia 2 weeks ago  Feels better now  No longer on antibiotics  " I feel better " Mood more positive  Pt says energy and motivation are slowly improving  Anxiety less intense  Less worried about financial issues  Actively participates in [place]  Sleep " a little bit better that it was before " He denies SI/HI  Overall better  Review Of Systems:     Constitutional Negative   ENT Negative   Cardiovascular HTN   Respiratory Negative/ recent hx of pneumonia   Gastrointestinal indigestion due to antibiotics   Genitourinary Negative   Musculoskeletal arthritis   Integumentary Negative   Neurological Negative   Endocrine Negative     Past Medical History:   Patient Active Problem List   Diagnosis    [facility] )    Toxic metabolic encephalopathy    Lobar pneumonia ([facility] )    HTN (hypertension)    Elevated serum creatinine    Anemia    Generalized anxiety disorder    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)       Allergies:    Allergies   Allergen Reactions    Codeine Itching       Past Surgical History:   Past Surgical History:   Procedure Laterality Date    ABDOMINAL SURGERY      EYE SURGERY      HERNIA REPAIR      TONSILLECTOMY         Past Psychiatric History: Dr Kiki Dhillon at [address] [facility] History: pt not sure    Social History:  , lives by self, has 2 adult children    Substance Abuse History: lost brother as a young child    Traumatic History: pt denies    The following portions of the patient's history were reviewed and updated as appropriate: past family history, past medical history, past social history, past surgical history and problem list       Mental status:  Appearance Casually dressed   Mood improved   Affect Mood congruent   Speech Normal rate, rhythm, and volume   Thought Processes Linear and goal directed   Associations intact associations   Hallucinations Denies any auditory or visual hallucinations   Thought Content No passive or active suicidal or homicidal ideation, intent, or plan  Orientation Oriented to person, place, time, and situation   Recent and Remote Memory Grossly intact   Attention Span and Concentration Concentration intact   Intellect Appears to be of Average Intelligence   Insight Insight intact   Judgement judgment was intact   Muscle Strength Normal gait    Language Within normal limits   Fund of Knowledge Age appropriate   Pain Not discussed            ? Assessment/Plan: improved symptoms/ continue Pristiq 50 mg daily  Follow up visit in 4 weeks         Diagnosis: Generalized Anxiety Disorder     Risks, Benefits And Possible Side Effects Of Medications:  Risks, benefits, and possible side effects of medications explained to patient and family, they verbalize understanding    Controlled Medication Discussion: SHANIA

## 2022-09-22 ENCOUNTER — SOCIAL WORK (OUTPATIENT)
Dept: BEHAVIORAL/MENTAL HEALTH CLINIC | Facility: CLINIC | Age: 82
End: 2022-09-22
Payer: COMMERCIAL

## 2022-09-22 DIAGNOSIS — F41.1 GENERALIZED ANXIETY DISORDER: Primary | ICD-10-CM

## 2022-09-22 PROCEDURE — 90834 PSYTX W PT 45 MINUTES: CPT | Performed by: PSYCHOLOGIST

## 2022-09-22 NOTE — PSYCH
Psychotherapy Provided: Individual Psychotherapy 45 minutes     Length of time in session: 45 minutes, follow up in 2 week    Encounter Diagnosis     ICD-10-CM    1  Generalized anxiety disorder  F41 1        Goals addressed in session: Goal 1     Pain:      none  :   1    Current suicide risk : Low     Start: 11am, End: 11:46am   D- Client enjoys his [employer] members, but has problems with some of the women  He perceives that they "yell at him", but realizes he "has to learn to deal with different people"  He has history of negative relationships, sees others as being negative to him, and he takes no ownership in problems  He worries about his finances, not having enough money for propane, for the winter  His Gn[employer] members have volunteered to come and help him clean up his trailer, but he hasn't found the right time yet  Finding time in his schedule is a problem, as he doesn't take changes easily  He prefers to have a schedule he can follow eduardo ry day, without changes  A- Client prefers to socialize with his Gn[employer] members, or his children  He has difficulty with his brother, which has a long history  He also benefits from talking to his therapist and psychiatrist on a regular basis  P- Clinician will continue to talk to client about how to handle his anxiety, worries, lori  regarding financial worries, and feeling lonely  Behavioral Health Treatment Plan -[facility]: Diagnosis and Treatment Plan explained to Macy Marino relates understanding diagnosis and is agreeable to Treatment Plan   Yes

## 2022-10-06 ENCOUNTER — SOCIAL WORK (OUTPATIENT)
Dept: BEHAVIORAL/MENTAL HEALTH CLINIC | Facility: CLINIC | Age: 82
End: 2022-10-06
Payer: COMMERCIAL

## 2022-10-06 DIAGNOSIS — F41.1 GENERALIZED ANXIETY DISORDER: Primary | ICD-10-CM

## 2022-10-06 PROCEDURE — 90834 PSYTX W PT 45 MINUTES: CPT | Performed by: PSYCHOLOGIST

## 2022-10-06 NOTE — PSYCH
Psychotherapy Provided: Individual Psychotherapy 45 minutes     Length of time in session: 45 minutes, follow up in 2 weeks    Encounter Diagnosis     ICD-10-CM    1  Generalized anxiety disorder  F41 1        Goals addressed in session: Goal 1     Pain:      none    1    Current suicide risk : Low     Time in: 11 am, Time out: 11:45am  D- Client feels he is insecure with his finances, and doesn't like living in his current house  "It is what it is", and there are people from his [employer] who also live in the development  He admits he "doesn't like to be stuck home", enjoys being with other people  He gets bored,, wants to find a partner  When volunteering is suggested by therapist, client says he gets too anxious about commitments  His [employer] friends cleaned up his house, but client doesn't seem very positive about this  A- Client needs encouragement and support, to see positive things in his life, such as his friends who help him  He can become negative, only see the problems, or things he would prefer to do, such as moving to [place] in the winter  Client's homework is to write 3 positive things in his life each day  P- Clinician will continue to talk to client about how to handle his stressors, and to focus on the positives in his life, and things he is able to change      Behavioral Health Treatment Plan [facility]: Diagnosis and Treatment Plan explained to Alvaro Gill relates understanding diagnosis and is agreeable to Treatment Plan   Yes

## 2022-10-13 ENCOUNTER — OFFICE VISIT (OUTPATIENT)
Dept: PSYCHIATRY | Facility: CLINIC | Age: 82
End: 2022-10-13
Payer: COMMERCIAL

## 2022-10-13 DIAGNOSIS — F41.1 GENERALIZED ANXIETY DISORDER: ICD-10-CM

## 2022-10-13 DIAGNOSIS — F33.1 DEPRESSION, MAJOR, RECURRENT, MODERATE (HCC): Primary | ICD-10-CM

## 2022-10-13 PROCEDURE — 99213 OFFICE O/P EST LOW 20 MIN: CPT | Performed by: NURSE PRACTITIONER

## 2022-10-13 RX ORDER — DESVENLAFAXINE 50 MG/1
50 TABLET, EXTENDED RELEASE ORAL DAILY
Qty: 30 TABLET | Refills: 1 | Status: SHIPPED | OUTPATIENT
Start: 2022-10-13

## 2022-10-13 NOTE — PSYCH
Psychiatric Medication Management - 400 [facility] 81 y o  male MRN: [id number]      This note was not shared with the patient due to reasonable likelihood of causing patient harm    Reason for Visit: depression and anxiety symptoms    Subjective: Pt observed on Pristiq 50 mg am for depression and anxiety  " More good days but I have bad days too down in the dumps " Less frequent crying episodes  Mood more positive  " Energy is up but not where it should be " " I want to do things but then I think I am too old " Has plans to start guitar lessons  Denies SI/HI  Less frequent and less intense anxiety symptoms  Last panic attack " couple of days ago  I was about to crawl out of my skin " " I fall asleep watching T " Sleep is reported as broken  Pt reports about 4 hrs/ night  Discussed sleep hygiene techniques with pt  Actively participates in [place]  Tolerates medications well  No evidence or reports of medication se  Review Of Systems:     Constitutional Negative   ENT Negative   Cardiovascular HTN   Respiratory Negative/ pneumonia 2 mos ago   Gastrointestinal Negative   Genitourinary Negative   Musculoskeletal arthritis   Integumentary Negative   Neurological Negative   Endocrine Negative     Past Medical History:   Patient Active Problem List   Diagnosis   • Sepsis ([address] )   • Toxic metabolic encephalopathy   • Lobar pneumonia ([address] )   • HTN (hypertension)   • Elevated serum creatinine   • Anemia   • Generalized anxiety disorder   • 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)       Allergies:    Allergies   Allergen Reactions   • Codeine Itching       Past Surgical History:   Past Surgical History:   Procedure Laterality Date   • ABDOMINAL SURGERY     • EYE SURGERY     • HERNIA REPAIR     • TONSILLECTOMY         Past Psychiatric History: Dr Hiram Graham at [facility] Psychiatric History: pt not sure    Social History:  , lives by self, has 2 adult children    Substance Abuse History: lost brother as a young child    Traumatic History: pt denies    The following portions of the patient's history were reviewed and updated as appropriate: past family history, past medical history, past social history, past surgical history and problem list       Mental status:  Appearance Casually dressed   Mood Intermittently depressed mood   Affect Mood congruent   Speech Normal rate, rhythm, and volume   Thought Processes Linear and goal directed   Associations intact associations   Hallucinations Denies any auditory or visual hallucinations   Thought Content No passive or active suicidal or homicidal ideation, intent, or plan  Orientation Oriented to person, place, time, and situation   Recent and Remote Memory Grossly intact   Attention Span and Concentration Concentration intact   Intellect Appears to be of Average Intelligence   Insight Insight intact   Judgement judgment was intact   Muscle Strength Normal gait    Language Within normal limits   Fund of Knowledge Age appropriate   Pain Not discussed            ? Assessment/Plan: intermittent depression symptoms, improved anxiety symptoms/ pt prefers to continue  Pristiq 50 mg daily  Spent time educating pt on importance of good sleep and the sleep hygiene techniques  Sana Sigala He verbalized understanding  Follow up in 4 weeks  Diagnosis: Major Depressive Disorder, recurrent, Generalized Anxiety Disorder     Risks, Benefits And Possible Side Effects Of Medications:  Risks, benefits, and possible side effects of medications explained to patient and family, they verbalize understanding    Controlled Medication Discussion: NA       Visit Time    Visit Start Time: 10:40 am  Visit Stop Time: 11:10  AM  Total Visit Duration: 30 minutes

## 2022-10-20 ENCOUNTER — SOCIAL WORK (OUTPATIENT)
Dept: BEHAVIORAL/MENTAL HEALTH CLINIC | Facility: CLINIC | Age: 82
End: 2022-10-20
Payer: COMMERCIAL

## 2022-10-20 DIAGNOSIS — F41.1 GENERALIZED ANXIETY DISORDER: Primary | ICD-10-CM

## 2022-10-20 PROCEDURE — 90834 PSYTX W PT 45 MINUTES: CPT | Performed by: PSYCHOLOGIST

## 2022-10-20 NOTE — PSYCH
Psychotherapy Provided: Individual Psychotherapy 45 minutes     Length of time in session:45 minutes, follow up in 2 weeks    Encounter Diagnosis     ICD-10-CM    1  Generalized anxiety disorder  F41 1        Goals addressed in session: Goal 1     Pain:      none    1    Current suicide risk : Low     D- Client claims he always tries to be at least a half hour early, so he doesn't get anxious, begins to worry  He admits he avoid many things, if he can, so he doesn't get too anxious  Client says his SAD is returning, since the weather is getting colder, less daylight  Therapist recommends using his light box, and client agrees  He says he often "walks around", if he gets anxious, irritated  These are coping skills, he learned when he was in [facility]  He talked about his insurance problems, and how much he needs his [employer]-ism  A- Client suffers from anxiety, worries about things, and has learned to cope with this in different ways  He benefits by being proactive, and realizing what things bother him, and either avoid them, or compensate, by being early, etc   P-Clincian will continue to talk to client about how to handle his stressors, and to find healthy ways to cope with his anxiety, worries he has  Visit Time    Visit Start Time: 11 am  Visit Stop Time: 11:45am  Total Visit Duration: 45 minutes  Behavioral Health Treatment Plan  Luke: Diagnosis and Treatment Plan explained to Deepti Trinidad relates understanding diagnosis and is agreeable to Treatment Plan   Yes

## 2022-10-30 PROBLEM — J18.1 LOBAR PNEUMONIA (HCC): Status: RESOLVED | Noted: 2022-08-31 | Resolved: 2022-10-30

## 2022-10-31 PROBLEM — A41.9 SEPSIS (HCC): Status: RESOLVED | Noted: 2022-08-31 | Resolved: 2022-10-31

## 2022-11-03 ENCOUNTER — SOCIAL WORK (OUTPATIENT)
Dept: BEHAVIORAL/MENTAL HEALTH CLINIC | Facility: CLINIC | Age: 82
End: 2022-11-03

## 2022-11-03 DIAGNOSIS — F41.1 GENERALIZED ANXIETY DISORDER: Primary | ICD-10-CM

## 2022-11-03 DIAGNOSIS — F33.41 RECURRENT MAJOR DEPRESSIVE DISORDER, IN PARTIAL REMISSION (HCC): ICD-10-CM

## 2022-11-03 NOTE — PSYCH
Psychotherapy Provided: Individual Psychotherapy 45 minutes     Length of time in session: 45 minutes, follow up in 2 weeks    Encounter Diagnosis     ICD-10-CM    1  Generalized anxiety disorder  F41 1    2  Rec[facility] major depressive disorder, [facility] partial [facility] )  F33 41        Goals addressed in session: Goal 1     Pain:      none    1    Current suicide risk : Low     D- Client admits to being "very social", when the sun is out, "he is happy"  " When it is cloudy, he feels sad, caused by "lack of sunlight"  Client says he talks to himself, and wishes she could "go back 40 years"  He would make different decisions, as he is now happy as a [employer] in his [employer]  He remembers being "depressed all the time", felt like "he was in [facility]"  He needs encouragement, to focus on what he has now, and to make good choices, to further his happiness, appreciate the good things in his life  A- Client benefits by talking about his circumstances, how he feels about things in his life  He enjoys the therapy, and is willing to switch to another therapist, since therapist adivised client  that this will be next to last session with this therapist    Jean Dye- Client will transfer client to another therapist, after one more session with therapist  This will be a closing session, and goals will be evaluated at that time            Behavioral Health Treatment Plan [facility]: Diagnosis and Treatment Plan explained to Kira Holly relates understanding diagnosis and is agreeable to Treatment Plan   Yes     Visit Time    Visit Start Time: 11:00  Visit Stop Time: 11:45  Total Visit Duration: 45 minutes

## 2022-11-17 ENCOUNTER — OFFICE VISIT (OUTPATIENT)
Dept: PSYCHIATRY | Facility: CLINIC | Age: 82
End: 2022-11-17

## 2022-11-17 ENCOUNTER — SOCIAL WORK (OUTPATIENT)
Dept: BEHAVIORAL/MENTAL HEALTH CLINIC | Facility: CLINIC | Age: 82
End: 2022-11-17

## 2022-11-17 DIAGNOSIS — F33.41 RECURRENT MAJOR DEPRESSIVE DISORDER, IN PARTIAL REMISSION (HCC): Primary | ICD-10-CM

## 2022-11-17 DIAGNOSIS — F41.1 GENERALIZED ANXIETY DISORDER: ICD-10-CM

## 2022-11-17 NOTE — PSYCH
Psychotherapy Provided: Individual Psychotherapy 45 minutes     Length of time in session: 45 minutes, follow up in no week    Encounter Diagnosis     ICD-10-CM    1  Recurrent major depressive disorder, in partial [facility] ([facility] )  F33 41       2  Generalized anxiety disorder  F41 1           Goals addressed in session: Goal 1     Pain:      none    1    Current suicide risk : Low     D- Client was notified that this will be last session, as therapist is retiring  Client benefits from talking about his past, and memories which are still bothering him  He realizes that she has good people in his life now, such as [employer] members who came to his house, to help him clean up  He has a good sense of humor, often sees humor in things, make jokes  He admits he worries about finances, and needs reassurance that he will be OK  He is working on his nutrition, with a nutritionist, he sees once a month  A- Client benefits from support and encouragement, likes to  talk about his life, and his thoughts  He admits he can become depressed if he stays home too long, as he likes to socialize, be around people  P- Clinician will transfer client to another therapist, and client agrees  Bejhavioral Health Treatment Plan [facility]: Diagnosis and Treatment Plan explained to Kwan Best relates understanding diagnosis and is agreeable to Treatment Plan   Yes     Visit start and stop times:    11/18/22  Start Time: 1200  Stop Time: 1245  Total Visit Time: 45 minutes

## 2022-11-17 NOTE — PSYCH
Psychiatric Medication Management - 400 [facility] 81 y o  male MRN: [id number]      This note was not shared with the patient due to reasonable likelihood of causing patient harm    Reason for Visit: depression and anxiety symptoms    Subjective: Pt observed on Pristiq 50 mg am for depression and anxiety  Tolerates medications well  Remains active in [facility] frequently with Mu-[employer] members  " They helped me clean out my house " Met with nutritionist to learn healthy diet  Mood overall stable  Sleep " on and off  I need I new bed " Energy and motivation " is up and down " Has plans to start guitar lessons  Denies SI/HI  Less frequent and less intense anxiety symptoms  "  Have been worried a little bit about finances " One panic attack last week  " I don't know what triggered it " Has a therapist who is retiring  Waiting to see a new one  Will continue Pristiq 50 mg am      Review Of Systems:     Constitutional Negative   ENT Negative   Cardiovascular HTN   Respiratory Negative/ pneumonia 2 mos ago   Gastrointestinal Negative   Genitourinary Negative   Musculoskeletal arthritis   Integumentary Negative   Neurological Negative   Endocrine Negative       Allergies:    Allergies   Allergen Reactions   • Codeine Itching         Past Psychiatric History: Dr Bonifacio Garc[facility] at [facility] History: pt not sure    Social History:  , lives by self, has 2 adult children    Substance Abuse History: lost brother as a young child    Traumatic History: pt denies    The following portions of the patient's history were reviewed and updated as appropriate: past family history, past medical history, past social history, past surgical history and problem list       Mental status:  Appearance Casually dressed   Mood Intermittently depressed mood   Affect Mood congruent   Speech Normal rate, rhythm, and volume   Thought Processes Linear and goal directed   Associations intact associations   Hallucinations Denies any auditory or visual hallucinations   Thought Content No passive or active suicidal or homicidal ideation, intent, or plan  Orientation Oriented to person, place, time, and situation   Recent and Remote Memory Grossly intact   Attention Span and Concentration Concentration intact   Intellect Appears to be of Average Intelligence   Insight Insight intact   Judgement judgment was intact   Muscle Strength Normal gait    Language Within normal limits   Fund of Knowledge Age appropriate   Pain Not discussed            ? Assessment/Plan: intermittent depression and anxiety symptoms/ pt prefers to continue Pristiq 50 mg daily  Follow up in 4 weeks  Diagnosis: Major Depressive Disorder, recurrent, Generalized Anxiety Disorder     Risks, Benefits And Possible Side Effects Of Medications:  Risks, benefits, and possible side effects of medications explained to patient and family, they verbalize understanding    Controlled Medication Discussion: NA       Visit Time    Visit Start Time: 11:15 am  Visit Stop Time: 11: 35 AM  Total Visit Duration: 20 min

## 2022-11-18 ENCOUNTER — DOCUMENTATION (OUTPATIENT)
Dept: BEHAVIORAL/MENTAL HEALTH CLINIC | Facility: CLINIC | Age: 82
End: 2022-11-18

## 2022-11-18 DIAGNOSIS — F33.41 RECURRENT MAJOR DEPRESSIVE DISORDER, IN PARTIAL REMISSION (HCC): Primary | ICD-10-CM

## 2022-11-18 DIAGNOSIS — F41.1 GENERALIZED ANXIETY DISORDER: ICD-10-CM

## 2022-11-18 NOTE — PROGRESS NOTES
100 [facility]    Patient Name Onesimo Navas     Date of Birth: 81 y o  1940      MRN: [id number]    Admission Date: 1/29/2021    Date of Transfer: November 18, 2022    Admission Diagnosis:     Major Depressive Disorder  Anxiety Disorder    Current Diagnosis:     1  Recurrent major depressive disorder, in partial remission ([address] )        2  Generalized anxiety disorder            Reason for Admission: Ariel Mendez presented for treatment due to depressive symptoms and anxiety symptoms  Primary complaints included DEPRESSIVE SYMPTOMS: depressed mood, sadness, hopelessness, low energy and ANXIETY SYMPTOMS: worrying about everyday issues  Progress in Treatment: Ariel Mendez was seen for Medication Management and Individual Couseling  During the course of treatment he made good progress  Needs consistent support and requests a female  Episodes of Higher Level of Care: No    Transfer request Initiated by: Psychiatrist: None Therapist: Destini Cannon    Reason for Transfer Request: clinician leaving practice    Does this individual need a clinician with specialized training/expertise?: No    Is this client working with any other [facility] Providers/Therapists?  Psychiatrist: Nurse Practitioner Shannon Hawkins Therapist: None    Other pertinent issues: None    Are there any specific individuals who would be a “best fit” or who have already agreed to accept this transfer request?      Psychiatrist: None   Therapist: None  Rationale: Not Applicable    Attempts to maintain the current therapeutic relationship: No    Transfer request routed to Clinical Coordinator for input and/or approval      Comments from other involved providers and/or clinical coordinator: None    Destini Cannon, MED11/18/22

## 2022-11-20 RX ORDER — DESVENLAFAXINE 50 MG/1
50 TABLET, EXTENDED RELEASE ORAL DAILY
Qty: 30 TABLET | Refills: 1 | Status: SHIPPED | OUTPATIENT
Start: 2022-11-20

## 2022-11-21 ENCOUNTER — TELEPHONE (OUTPATIENT)
Dept: PSYCHIATRY | Facility: CLINIC | Age: 82
End: 2022-11-21

## 2022-11-21 NOTE — TELEPHONE ENCOUNTER
LVM to CB regarding if PT is willing to see [facility] therapist virtually   IF so, schedule with [facility]

## 2022-12-15 ENCOUNTER — TELEPHONE (OUTPATIENT)
Dept: PSYCHIATRY | Facility: CLINIC | Age: 82
End: 2022-12-15

## 2022-12-15 ENCOUNTER — OFFICE VISIT (OUTPATIENT)
Dept: PSYCHIATRY | Facility: CLINIC | Age: 82
End: 2022-12-15

## 2022-12-15 DIAGNOSIS — F33.9 MAJOR DEPRESSION, RECURRENT, CHRONIC (HCC): Primary | ICD-10-CM

## 2022-12-15 DIAGNOSIS — F41.1 GENERALIZED ANXIETY DISORDER: ICD-10-CM

## 2022-12-15 RX ORDER — DESVENLAFAXINE 25 MG/1
25 TABLET, EXTENDED RELEASE ORAL EVERY MORNING
COMMUNITY
End: 2022-12-15 | Stop reason: SDUPTHER

## 2022-12-15 RX ORDER — DESVENLAFAXINE 50 MG/1
50 TABLET, EXTENDED RELEASE ORAL DAILY
Qty: 30 TABLET | Refills: 1 | Status: SHIPPED | OUTPATIENT
Start: 2022-12-15

## 2022-12-15 RX ORDER — DESVENLAFAXINE 25 MG/1
25 TABLET, EXTENDED RELEASE ORAL EVERY MORNING
Qty: 30 TABLET | Refills: 1 | Status: SHIPPED | OUTPATIENT
Start: 2022-12-15

## 2022-12-15 NOTE — PSYCH
Psychiatric Medication Management - [address] [facility] 81 y o  male MRN: [id number]      This note was not shared with the patient due to reasonable likelihood of causing patient harm    Reason for Visit: depression and anxiety symptoms    Subjective: Pt observed on Pristiq 50 mg am for depression and anxiety  Tolerates medications well  Pt talks about feeling more depressed in the winter months  We talked about Seasonal Affective Roberto and pt says he uses SAD lamp daily  Pt reports lower energy and lower motivation  Middle insomnia few times a week  Pt takes naps during the day  We talked about sleep hygiene techniques  Denies SI/HI  Remains active in [facility] frequently with [employer] members  Met with nutritionist to learn healthy diet  Anxiety is intermittent and triggered by finances  Has plans to start guitar lessons  Have been worried a little bit about finances  Has a therapist who is retiring  Waiting to see a new one  Will increasePristiq to 75 mg am to improve depression symptoms  Review Of Systems:     Constitutional Negative   ENT Negative   Cardiovascular HTN   Respiratory Negative/ pneumonia 2 mos ago   Gastrointestinal Negative   Genitourinary Negative   Musculoskeletal arthritis   Integumentary Negative   Neurological Negative   Endocrine Negative       Allergies:    Allergies   Allergen Reactions   • Codeine Itching         Past Psychiatric History: Dr Kiki Dhillon at [address] [facility] History: pt not sure    Social History:  , lives by self, has 2 adult children    Substance Abuse History: lost brother as a young child    Traumatic History: pt denies    The following portions of the patient's history were reviewed and updated as appropriate: past family history, past medical history, past social history, past surgical history and problem list       Mental status:  Appearance Casually dressed   Mood depressed mood   Affect Mood congruent   Speech Normal rate, rhythm, and volume   Thought Processes Linear and goal directed   Associations intact associations   Hallucinations Denies any auditory or visual hallucinations   Thought Content No passive or active suicidal or homicidal ideation, intent, or plan  Orientation Oriented to person, place, time, and situation   Recent and Remote Memory Grossly intact   Attention Span and Concentration Concentration intact   Intellect Appears to be of Average Intelligence   Insight Insight intact   Judgement judgment was intact   Muscle Strength Normal gait    Language Within normal limits   Fund of Knowledge Age appropriate   Pain Not discussed            ? Assessment/Plan: increased depression symptoms/ increase Pristiq to 75 daily to improve depression symptoms  Follow up in 4 weeks  Diagnosis: Major Depressive Disorder, recurrent, Generalized Anxiety Disorder, r/o SAD    Risks, Benefits And Possible Side Effects Of Medications:  Risks, benefits, and possible side effects of medications explained to patient and family, they verbalize understanding    Controlled Medication Discussion: NA       Visit Time    Visit Start Time: 10: 30 AM  Visit Stop Time: 11:00 AM  Total Visit Duration: 30 min

## 2022-12-15 NOTE — TELEPHONE ENCOUNTER
Pt has been scheduled for transfer of care from [facility] with Cindi Hicks on 12/27 at 10am

## 2022-12-27 ENCOUNTER — SOCIAL WORK (OUTPATIENT)
Dept: BEHAVIORAL/MENTAL HEALTH CLINIC | Facility: CLINIC | Age: 82
End: 2022-12-27

## 2022-12-27 DIAGNOSIS — F33.41 RECURRENT MAJOR DEPRESSIVE DISORDER, IN PARTIAL REMISSION (HCC): Primary | ICD-10-CM

## 2022-12-27 DIAGNOSIS — F41.1 GENERALIZED ANXIETY DISORDER: ICD-10-CM

## 2022-12-27 NOTE — BH TREATMENT PLAN
Evaristo Fang  1940       Date of Initial Treatment Plan: prior therapist, uncertain   Date of Current Treatment Plan: 12/27/22    Treatment Plan Number one with current therapist     Strengths/Personal Resources for Self Care: Latter day supports and activity    Diagnosis:   1  Recurrent major depressive disorder, in partial remission ([facility] [address] )        2  Generalized anxiety disorder            Area of Needs: stable mental health, improve sleep      Long Term Goal 1: A- Maintain stable mental health    Target Date: N/A  Completion Date: N/A         Short Term Objectives for Goal 1: A- Maintain Latter day activities            B- Clinical discussions about aging            C: Improve sleep      GOAL 1: Modality: Individual 2x per month   Completion Date - ongoing medication service      [address]: Diagnosis and Treatment Plan explained to Mylene Vaishnavi relates understanding diagnosis and is agreeable to Treatment Plan            Client Comments : Please share your thoughts, feelings, need and/or experiences regarding your treatment plan:

## 2022-12-27 NOTE — PSYCH
Psychotherapy Provided: Individual Psychotherapy 45 minutes     Length of time in session: 45 minutes, follow up in two weeks    Encounter Diagnosis     ICD-10-CM    1  Recurrent major depressive disorder, in partial remission ([facility] [address] )  F33 41       2  Generalized anxiety disorder  F41 1           Goals addressed in session: Goal 1     Pain:      moderate to severe    2    Current suicide risk : Low     Data: 1st session with Ed (82) after transfer from retired therapist  Sudha Ana Laura presents well, history of wintertime depression but becoming very active with a [employer] and always pursuing hobbies  In hospital 2 weeks ago due to [place]  Poor sleep, and discussed sleep improvement ideas  Assessment: [facility] med service  No risks assessed  Age and sleep related depression and anxiety  PHQ9 score of 9, on medication  Progress: Identified treatment plan goals of activity to lower depression, talking clinically about aging, improving sleep  Preparation phase  Plan: Client wants to meet 2X month  Clinical plan focused on aging  Behavioral Health Treatment Plan [facility]: Diagnosis and Treatment Plan explained to Ravindra Cedeno relates understanding diagnosis and is agreeable to Treatment Plan   Yes     Visit start and stop times:    12/27/22  Start Time: 1450  Stop Time: 1535  Total Visit Time: 45 minutes

## 2023-01-10 ENCOUNTER — TELEPHONE (OUTPATIENT)
Dept: PSYCHIATRY | Facility: CLINIC | Age: 83
End: 2023-01-10

## 2023-01-10 NOTE — TELEPHONE ENCOUNTER
Client LVM yesterday requesting to cancel upcoming appt with Angelina Yusuf and Sejal Renee, states he has new insurance and we don't accept it  Called client today to inquire about the type of insurance, he now has [employer]  Verified that we do not accept that; cancelled appts with Angelina Yusuf and Sejal Renee  Emailed both  to make them aware of the situation

## 2023-01-11 ENCOUNTER — DOCUMENTATION (OUTPATIENT)
Dept: PSYCHIATRY | Facility: CLINIC | Age: 83
End: 2023-01-11

## 2023-02-02 ENCOUNTER — TELEPHONE (OUTPATIENT)
Dept: PSYCHIATRY | Facility: CLINIC | Age: 83
End: 2023-02-02

## 2023-02-02 NOTE — TELEPHONE ENCOUNTER
Pt called in stating he spoke with his insurance [facility]) and that they would pay his bill if he continued his care at [facility]  I explained to pt that [facility] is not contracted with [employer] so unfortunately we would not be able to see him here  Pt understood and phone call was ended

## 2025-07-23 DIAGNOSIS — F41.1 GENERALIZED ANXIETY DISORDER: ICD-10-CM

## 2025-07-23 DIAGNOSIS — F33.9 MAJOR DEPRESSION, RECURRENT, CHRONIC (HCC): ICD-10-CM

## 2025-07-23 RX ORDER — DESVENLAFAXINE 50 MG/1
50 TABLET, FILM COATED, EXTENDED RELEASE ORAL DAILY
Qty: 30 TABLET | Refills: 1 | Status: SHIPPED | OUTPATIENT
Start: 2025-07-23

## 2025-07-23 RX ORDER — DESVENLAFAXINE 25 MG/1
25 TABLET, EXTENDED RELEASE ORAL EVERY MORNING
Qty: 30 TABLET | Refills: 1 | Status: CANCELLED | OUTPATIENT
Start: 2025-07-23

## 2025-07-23 NOTE — TELEPHONE ENCOUNTER
Reason for call:   [x] Refill   [] Prior Auth  [] Other:     Office:   [x] PCP/Provider - Rosalba Rhoades/GEOFFREY Duffy  [] Specialty/Provider -     Medication: Desvenlafaxine Succinate    Dose/Frequency: 50 mg tb24      Quantity: #90    Pharmacy [facility] [address] [facility]   Does the patient have enough for 3 days?   [] Yes   [x] No - Send as HP to POD    Mail Away Pharmacy   Does the patient have enough for 10 days?   [] Yes   [] No - Send as HP to POD